# Patient Record
Sex: FEMALE | Race: WHITE | NOT HISPANIC OR LATINO | Employment: FULL TIME | URBAN - METROPOLITAN AREA
[De-identification: names, ages, dates, MRNs, and addresses within clinical notes are randomized per-mention and may not be internally consistent; named-entity substitution may affect disease eponyms.]

---

## 2023-09-28 NOTE — PROGRESS NOTES
"Subjective   Patient ID: Kassi Edmonds is a 33 y.o. female who presents for Follow-up (Follow up Covid 3 weeks ).    Presents for follow up from recent COVID infection  COVID positive a few weeks ago  Had taken time off over the duration of illness.   Covid symptoms completely resolved at this time.  Had a sore throat, sinus congestion and fatigue.   Had been exposed at work    Scheduled for tubal ligation in a few weeks.         Review of Systems    Objective   /76 (BP Location: Right arm, Patient Position: Sitting, BP Cuff Size: Adult)   Pulse 59   Temp 36.6 °C (97.8 °F) (Temporal)   Ht 1.6 m (5' 3\")   Wt 63.6 kg (140 lb 3.2 oz)   LMP 09/28/2023 (Exact Date)   SpO2 99%   BMI 24.84 kg/m²     Physical Exam  Vitals and nursing note reviewed.   Constitutional:       General: She is not in acute distress.     Appearance: Normal appearance. She is normal weight.   HENT:      Head: Normocephalic.      Right Ear: Tympanic membrane, ear canal and external ear normal.      Left Ear: Tympanic membrane, ear canal and external ear normal.      Nose: Nose normal.      Mouth/Throat:      Mouth: Mucous membranes are moist.      Pharynx: Posterior oropharyngeal erythema present.   Eyes:      Conjunctiva/sclera: Conjunctivae normal.   Cardiovascular:      Rate and Rhythm: Normal rate and regular rhythm.      Pulses: Normal pulses.      Heart sounds: Normal heart sounds. No murmur heard.  Pulmonary:      Effort: Pulmonary effort is normal. No respiratory distress.      Breath sounds: Normal breath sounds.   Lymphadenopathy:      Cervical: No cervical adenopathy.   Psychiatric:         Mood and Affect: Mood normal.         Assessment/Plan   Diagnoses and all orders for this visit:  Routine general medical examination at a health care facility  -     Lipid panel; Future  -     Basic metabolic panel; Future  BMI 24.0-24.9, adult  SARS-CoV-2 positive         "

## 2023-09-29 ENCOUNTER — OFFICE VISIT (OUTPATIENT)
Dept: PRIMARY CARE | Facility: CLINIC | Age: 33
End: 2023-09-29
Payer: COMMERCIAL

## 2023-09-29 VITALS
DIASTOLIC BLOOD PRESSURE: 76 MMHG | BODY MASS INDEX: 24.84 KG/M2 | SYSTOLIC BLOOD PRESSURE: 114 MMHG | HEART RATE: 59 BPM | HEIGHT: 63 IN | WEIGHT: 140.2 LBS | TEMPERATURE: 97.8 F | OXYGEN SATURATION: 99 %

## 2023-09-29 DIAGNOSIS — Z00.00 ROUTINE GENERAL MEDICAL EXAMINATION AT A HEALTH CARE FACILITY: Primary | ICD-10-CM

## 2023-09-29 DIAGNOSIS — U07.1 SARS-COV-2 POSITIVE: ICD-10-CM

## 2023-09-29 PROCEDURE — 3008F BODY MASS INDEX DOCD: CPT | Performed by: NURSE PRACTITIONER

## 2023-09-29 PROCEDURE — 1036F TOBACCO NON-USER: CPT | Performed by: NURSE PRACTITIONER

## 2023-09-29 PROCEDURE — 99395 PREV VISIT EST AGE 18-39: CPT | Performed by: NURSE PRACTITIONER

## 2023-09-29 RX ORDER — CETIRIZINE HYDROCHLORIDE 5 MG/1
5 TABLET ORAL DAILY
COMMUNITY

## 2023-09-29 ASSESSMENT — PATIENT HEALTH QUESTIONNAIRE - PHQ9
1. LITTLE INTEREST OR PLEASURE IN DOING THINGS: NOT AT ALL
2. FEELING DOWN, DEPRESSED OR HOPELESS: NOT AT ALL
SUM OF ALL RESPONSES TO PHQ9 QUESTIONS 1 AND 2: 0

## 2023-09-29 NOTE — PATIENT INSTRUCTIONS
Covid symptoms resolved at this time.    Order given for fasting blood work to have done when you have preop testing.    Stay active with regular exercise at least 30 minutes a day at least 5 days a week.  Follow a healthy low fat, low cholesterol diet.    Follow up in one year for annual well check

## 2023-12-13 ENCOUNTER — OFFICE VISIT (OUTPATIENT)
Dept: PRIMARY CARE | Facility: CLINIC | Age: 33
End: 2023-12-13
Payer: COMMERCIAL

## 2023-12-13 VITALS
DIASTOLIC BLOOD PRESSURE: 78 MMHG | HEART RATE: 68 BPM | WEIGHT: 139 LBS | HEIGHT: 63 IN | SYSTOLIC BLOOD PRESSURE: 118 MMHG | TEMPERATURE: 97.8 F | OXYGEN SATURATION: 99 % | BODY MASS INDEX: 24.63 KG/M2

## 2023-12-13 DIAGNOSIS — F98.8 ATTENTION DEFICIT DISORDER, UNSPECIFIED HYPERACTIVITY PRESENCE: Primary | ICD-10-CM

## 2023-12-13 PROCEDURE — 3008F BODY MASS INDEX DOCD: CPT | Performed by: NURSE PRACTITIONER

## 2023-12-13 PROCEDURE — 1036F TOBACCO NON-USER: CPT | Performed by: NURSE PRACTITIONER

## 2023-12-13 PROCEDURE — 99213 OFFICE O/P EST LOW 20 MIN: CPT | Performed by: NURSE PRACTITIONER

## 2023-12-13 PROCEDURE — 80307 DRUG TEST PRSMV CHEM ANLYZR: CPT

## 2023-12-13 RX ORDER — DEXTROAMPHETAMINE SACCHARATE, AMPHETAMINE ASPARTATE MONOHYDRATE, DEXTROAMPHETAMINE SULFATE AND AMPHETAMINE SULFATE 1.25; 1.25; 1.25; 1.25 MG/1; MG/1; MG/1; MG/1
5 CAPSULE, EXTENDED RELEASE ORAL EVERY MORNING
Qty: 30 CAPSULE | Refills: 0 | Status: SHIPPED | OUTPATIENT
Start: 2023-12-13 | End: 2024-01-18 | Stop reason: SDUPTHER

## 2023-12-13 ASSESSMENT — ENCOUNTER SYMPTOMS
DEPRESSION: 0
LOSS OF SENSATION IN FEET: 0
OCCASIONAL FEELINGS OF UNSTEADINESS: 0

## 2023-12-13 ASSESSMENT — PAIN SCALES - GENERAL: PAINLEVEL: 0-NO PAIN

## 2023-12-13 ASSESSMENT — PATIENT HEALTH QUESTIONNAIRE - PHQ9
2. FEELING DOWN, DEPRESSED OR HOPELESS: NOT AT ALL
1. LITTLE INTEREST OR PLEASURE IN DOING THINGS: NOT AT ALL
SUM OF ALL RESPONSES TO PHQ9 QUESTIONS 1 AND 2: 0

## 2023-12-13 NOTE — PROGRESS NOTES
"Subjective   Patient ID: Kassi Edmonds is a 33 y.o. female who presents for ADD and ADHD.    Hx of ADD.  Adderall 5mg last taken when in grad school.  After surgery has been having some trouble with focus and concentration, decreased motivation.  Had been on light duty, now able to return to normal work.  Mood is good. Sleep good.      ADD    ADHD         OARRS:  Ryanne Jordan, APRN-CNP on 12/13/2023  9:13 AM  I have personally reviewed the OARRS report for Kassi Edmonds. I have considered the risks of abuse, dependence, addiction and diversion and I believe that it is clinically appropriate for Kassi Edmonds to be prescribed this medication    Is the patient prescribed a combination of a benzodiazepine and opioid?  No    Last Urine Drug Screen / ordered today: Yes  No results found for this or any previous visit (from the past 8760 hour(s)).  Awaiting results    Clinical rationale for not completing a Urine Drug Screen: NA      Controlled Substance Agreement:  Date of the Last Agreement: 12/13/2023  Reviewed Controlled Substance Agreement including but not limited to the benefits, risks, and alternatives to treatment with a Controlled Substance medication(s).    Stimulants:   What is the patient's goal of therapy? ADD  Is this being achieved with current treatment? TBD    Activities of Daily Living:   Is your overall impression that this patient is benefiting (symptom reduction outweighs side effects) from stimulant therapy? TBD    1. Physical Functioning: Same  2. Family Relationship: Same  3. Social Relationship: Same  4. Mood: Same  5. Sleep Patterns: Same  6. Overall Function: Same      Review of Systems    Objective   /78 (BP Location: Right arm, Patient Position: Sitting, BP Cuff Size: Small adult)   Pulse 68   Temp 36.6 °C (97.8 °F) (Oral)   Ht 1.6 m (5' 3\")   Wt 63 kg (139 lb)   LMP 12/11/2023   SpO2 99%   BMI 24.62 kg/m²     Physical Exam  Vitals and nursing note reviewed.   Constitutional:  "      General: She is not in acute distress.     Appearance: Normal appearance. She is normal weight.   Cardiovascular:      Rate and Rhythm: Normal rate and regular rhythm.      Heart sounds: Normal heart sounds. No murmur heard.  Pulmonary:      Effort: Pulmonary effort is normal. No respiratory distress.      Breath sounds: Normal breath sounds.   Psychiatric:         Mood and Affect: Mood normal.         Judgment: Judgment normal.         Assessment/Plan   Diagnoses and all orders for this visit:  Attention deficit disorder, unspecified hyperactivity presence  -     Drug Screen, Urine With Reflex to Confirmation  -     amphetamine-dextroamphetamine XR (Adderall XR) 5 mg 24 hr capsule; Take 1 capsule (5 mg) by mouth once daily in the morning. Do not crush or chew.

## 2023-12-13 NOTE — PATIENT INSTRUCTIONS
CSA and UDS in office today.  Start Adderall XR 5mg daily.  Follow up in one month for evaluation of therapy and BP check

## 2023-12-14 LAB
AMPHETAMINES UR QL SCN: NORMAL
BARBITURATES UR QL SCN: NORMAL
BENZODIAZ UR QL SCN: NORMAL
BZE UR QL SCN: NORMAL
CANNABINOIDS UR QL SCN: NORMAL
FENTANYL+NORFENTANYL UR QL SCN: NORMAL
OPIATES UR QL SCN: NORMAL
OXYCODONE+OXYMORPHONE UR QL SCN: NORMAL
PCP UR QL SCN: NORMAL

## 2024-01-18 ENCOUNTER — OFFICE VISIT (OUTPATIENT)
Dept: PRIMARY CARE | Facility: CLINIC | Age: 34
End: 2024-01-18
Payer: COMMERCIAL

## 2024-01-18 VITALS
HEART RATE: 68 BPM | BODY MASS INDEX: 25.16 KG/M2 | WEIGHT: 142 LBS | OXYGEN SATURATION: 99 % | HEIGHT: 63 IN | SYSTOLIC BLOOD PRESSURE: 116 MMHG | DIASTOLIC BLOOD PRESSURE: 70 MMHG

## 2024-01-18 DIAGNOSIS — F98.8 ATTENTION DEFICIT DISORDER, UNSPECIFIED HYPERACTIVITY PRESENCE: Primary | ICD-10-CM

## 2024-01-18 DIAGNOSIS — Z00.00 ROUTINE GENERAL MEDICAL EXAMINATION AT A HEALTH CARE FACILITY: ICD-10-CM

## 2024-01-18 PROCEDURE — 1036F TOBACCO NON-USER: CPT | Performed by: NURSE PRACTITIONER

## 2024-01-18 PROCEDURE — 3008F BODY MASS INDEX DOCD: CPT | Performed by: NURSE PRACTITIONER

## 2024-01-18 PROCEDURE — 99213 OFFICE O/P EST LOW 20 MIN: CPT | Performed by: NURSE PRACTITIONER

## 2024-01-18 RX ORDER — DEXTROAMPHETAMINE SACCHARATE, AMPHETAMINE ASPARTATE MONOHYDRATE, DEXTROAMPHETAMINE SULFATE AND AMPHETAMINE SULFATE 1.25; 1.25; 1.25; 1.25 MG/1; MG/1; MG/1; MG/1
5 CAPSULE, EXTENDED RELEASE ORAL EVERY MORNING
Qty: 30 CAPSULE | Refills: 0 | Status: SHIPPED | OUTPATIENT
Start: 2024-01-18 | End: 2024-02-22 | Stop reason: SDUPTHER

## 2024-01-18 ASSESSMENT — PATIENT HEALTH QUESTIONNAIRE - PHQ9
6. FEELING BAD ABOUT YOURSELF - OR THAT YOU ARE A FAILURE OR HAVE LET YOURSELF OR YOUR FAMILY DOWN: NOT AT ALL
8. MOVING OR SPEAKING SO SLOWLY THAT OTHER PEOPLE COULD HAVE NOTICED. OR THE OPPOSITE, BEING SO FIGETY OR RESTLESS THAT YOU HAVE BEEN MOVING AROUND A LOT MORE THAN USUAL: NOT AT ALL
9. THOUGHTS THAT YOU WOULD BE BETTER OFF DEAD, OR OF HURTING YOURSELF: NOT AT ALL
2. FEELING DOWN, DEPRESSED OR HOPELESS: NOT AT ALL
SUM OF ALL RESPONSES TO PHQ9 QUESTIONS 1 AND 2: 3
5. POOR APPETITE OR OVEREATING: NOT AT ALL
SUM OF ALL RESPONSES TO PHQ QUESTIONS 1-9: 3
4. FEELING TIRED OR HAVING LITTLE ENERGY: NOT AT ALL
3. TROUBLE FALLING OR STAYING ASLEEP OR SLEEPING TOO MUCH: NOT AT ALL
1. LITTLE INTEREST OR PLEASURE IN DOING THINGS: NEARLY EVERY DAY
7. TROUBLE CONCENTRATING ON THINGS, SUCH AS READING THE NEWSPAPER OR WATCHING TELEVISION: NOT AT ALL

## 2024-01-18 ASSESSMENT — ENCOUNTER SYMPTOMS
OCCASIONAL FEELINGS OF UNSTEADINESS: 0
DEPRESSION: 0
LOSS OF SENSATION IN FEET: 0

## 2024-01-18 NOTE — PROGRESS NOTES
Subjective   Patient ID: Kassi Edmonds is a 34 y.o. female who presents for Follow-up (Patient is for Follow-up From a Recent medication That she Was Put on, She states The medication is working well.).    Presents for ADHD follow up.  Started on Adderall XR 5mg last visit.  Tolerating well. Denies any palpitations, insomnia.  Initial insomnia, now ok.  Has been taking most days. Does skip occasionally on off days.    Has been thinking about gender change surgery. Is interested in following up with [facility] - has been unable to get call back since September.          OARRS:  Ryanne Jordan, APRN-CNP on 1/18/2024  6:54 AM  I have personally reviewed the OARRS report for Kassi Edmonds. I have considered the risks of abuse, dependence, addiction and diversion and I believe that it is clinically appropriate for Kassi Edmonds to be prescribed this medication    Is the patient prescribed a combination of a benzodiazepine and opioid?  No    Last Urine Drug Screen / ordered today: Yes  Recent Results (from the past 8760 hour(s))   Drug Screen, Urine With Reflex to Confirmation    Collection Time: 12/13/23 12:17 PM   Result Value Ref Range    Amphetamine Screen, Urine Presumptive Negative Presumptive Negative    Barbiturate Screen, Urine Presumptive Negative Presumptive Negative    Benzodiazepines Screen, Urine Presumptive Negative Presumptive Negative    Cannabinoid Screen, Urine Presumptive Negative Presumptive Negative    Cocaine Metabolite Screen, Urine Presumptive Negative Presumptive Negative    Fentanyl Screen, Urine Presumptive Negative Presumptive Negative    Opiate Screen, Urine Presumptive Negative Presumptive Negative    Oxycodone Screen, Urine Presumptive Negative Presumptive Negative    PCP Screen, Urine Presumptive Negative Presumptive Negative     Results are as expected.         Controlled Substance Agreement:  Date of the Last Agreement: 12/13/2023  Reviewed Controlled Substance Agreement including but not  "limited to the benefits, risks, and alternatives to treatment with a Controlled Substance medication(s).    Stimulants:   What is the patient's goal of therapy? ADHD  Is this being achieved with current treatment? yes    Activities of Daily Living:   Is your overall impression that this patient is benefiting (symptom reduction outweighs side effects) from stimulant therapy? Yes     1. Physical Functioning: Better  2. Family Relationship: Better  3. Social Relationship: Better  4. Mood: Better  5. Sleep Patterns: Better  6. Overall Function: Better      Review of Systems    Objective   /70 (BP Location: Left arm, Patient Position: Sitting)   Pulse 68   Ht 1.6 m (5' 3\")   Wt 64.4 kg (142 lb)   SpO2 99%   BMI 25.15 kg/m²     Physical Exam  Vitals and nursing note reviewed.   Constitutional:       General: She is not in acute distress.     Appearance: Normal appearance. She is normal weight.   Cardiovascular:      Rate and Rhythm: Normal rate and regular rhythm.      Heart sounds: Normal heart sounds. No murmur heard.  Pulmonary:      Effort: Pulmonary effort is normal. No respiratory distress.      Breath sounds: Normal breath sounds.   Psychiatric:         Mood and Affect: Mood normal.         Behavior: Behavior normal.         Judgment: Judgment normal.         Assessment/Plan   Diagnoses and all orders for this visit:  Attention deficit disorder, unspecified hyperactivity presence  -     amphetamine-dextroamphetamine XR (Adderall XR) 5 mg 24 hr capsule; Take 1 capsule (5 mg) by mouth once daily in the morning. Do not crush or chew.  Routine general medical examination at a health care facility  -     Referral to [facility]; Future         "

## 2024-01-18 NOTE — PATIENT INSTRUCTIONS
ADHD well controlled on current medications.  Refill Adderall.  Follow up in 6 months for evaluation of therapy.    Referral placed, faxed for [facility] for [facility] program.  Please let me know if our office can further help with this process.

## 2024-02-22 ENCOUNTER — HOSPITAL ENCOUNTER (EMERGENCY)
Facility: HOSPITAL | Age: 34
Discharge: HOME | End: 2024-02-22
Payer: COMMERCIAL

## 2024-02-22 VITALS
OXYGEN SATURATION: 97 % | TEMPERATURE: 97.7 F | HEART RATE: 76 BPM | DIASTOLIC BLOOD PRESSURE: 74 MMHG | RESPIRATION RATE: 16 BRPM | SYSTOLIC BLOOD PRESSURE: 126 MMHG
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
## 2024-02-22 DIAGNOSIS — F98.8 ATTENTION DEFICIT DISORDER, UNSPECIFIED HYPERACTIVITY PRESENCE: ICD-10-CM

## 2024-02-22 LAB
ALBUMIN SERPL BCP-MCNC: 4.6 G/DL (ref 3.4–5)
ALP SERPL-CCNC: 34 U/L (ref 33–110)
ALT SERPL W P-5'-P-CCNC: 12 U/L (ref 7–45)
AST SERPL W P-5'-P-CCNC: 12 U/L (ref 9–39)
BILIRUB DIRECT SERPL-MCNC: 0.2 MG/DL (ref 0–0.3)
BILIRUB SERPL-MCNC: 0.6 MG/DL (ref 0–1.2)
HCV AB SER QL: NONREACTIVE
HIV 1+2 AB+HIV1 P24 AG SERPL QL IA: NONREACTIVE
PROT SERPL-MCNC: 7.7 G/DL (ref 6.4–8.2)

## 2024-02-22 PROCEDURE — 2500000004 HC RX 250 GENERAL PHARMACY W/ HCPCS (ALT 636 FOR OP/ED): Performed by: PHYSICIAN ASSISTANT

## 2024-02-22 PROCEDURE — 87389 HIV-1 AG W/HIV-1&-2 AB AG IA: CPT | Performed by: PHYSICIAN ASSISTANT

## 2024-02-22 PROCEDURE — 2500000005 HC RX 250 GENERAL PHARMACY W/O HCPCS: Performed by: PHYSICIAN ASSISTANT

## 2024-02-22 PROCEDURE — 86803 HEPATITIS C AB TEST: CPT | Performed by: PHYSICIAN ASSISTANT

## 2024-02-22 PROCEDURE — 80076 HEPATIC FUNCTION PANEL: CPT | Performed by: PHYSICIAN ASSISTANT

## 2024-02-22 PROCEDURE — 90715 TDAP VACCINE 7 YRS/> IM: CPT | Performed by: PHYSICIAN ASSISTANT

## 2024-02-22 PROCEDURE — 2500000001 HC RX 250 WO HCPCS SELF ADMINISTERED DRUGS (ALT 637 FOR MEDICARE OP): Performed by: PHYSICIAN ASSISTANT

## 2024-02-22 PROCEDURE — 90471 IMMUNIZATION ADMIN: CPT | Performed by: PHYSICIAN ASSISTANT

## 2024-02-22 PROCEDURE — 36415 COLL VENOUS BLD VENIPUNCTURE: CPT | Performed by: PHYSICIAN ASSISTANT

## 2024-02-22 PROCEDURE — 99284 EMERGENCY DEPT VISIT MOD MDM: CPT | Performed by: PHYSICIAN ASSISTANT

## 2024-02-22 PROCEDURE — 99283 EMERGENCY DEPT VISIT LOW MDM: CPT | Mod: 25 | Performed by: PHYSICIAN ASSISTANT

## 2024-02-22 RX ORDER — EMTRICITABINE AND TENOFOVIR DISOPROXIL FUMARATE 200; 300 MG/1; MG/1
1 TABLET, FILM COATED ORAL DAILY
Qty: 30 TABLET | Refills: 0 | Status: SHIPPED | OUTPATIENT
Start: 2024-02-22 | End: 2024-03-26 | Stop reason: ALTCHOICE

## 2024-02-22 RX ORDER — EMTRICITABINE AND TENOFOVIR DISOPROXIL FUMARATE 200; 300 MG/1; MG/1
1 TABLET, FILM COATED ORAL ONCE
Status: COMPLETED | OUTPATIENT
Start: 2024-02-22 | End: 2024-02-22

## 2024-02-22 RX ORDER — ONDANSETRON 4 MG/1
4 TABLET, ORALLY DISINTEGRATING ORAL ONCE
Status: COMPLETED | OUTPATIENT
Start: 2024-02-22 | End: 2024-02-22

## 2024-02-22 RX ORDER — ONDANSETRON 4 MG/1
4 TABLET, ORALLY DISINTEGRATING ORAL EVERY 8 HOURS PRN
Qty: 30 TABLET | Refills: 1 | Status: SHIPPED | OUTPATIENT
Start: 2024-02-22 | End: 2024-03-26 | Stop reason: ALTCHOICE

## 2024-02-22 RX ADMIN — TETANUS TOXOID, REDUCED DIPHTHERIA TOXOID AND ACELLULAR PERTUSSIS VACCINE, ADSORBED 0.5 ML: 5; 2.5; 8; 8; 2.5 SUSPENSION INTRAMUSCULAR at 14:00

## 2024-02-22 RX ADMIN — ONDANSETRON 4 MG: 4 TABLET, ORALLY DISINTEGRATING ORAL at 14:00

## 2024-02-22 RX ADMIN — DOLUTEGRAVIR SODIUM 50 MG: 50 TABLET, FILM COATED ORAL at 14:12

## 2024-02-22 RX ADMIN — EMTRICITABINE AND TENOFOVIR DISOPROXIL FUMARATE 1 TABLET: 200; 300 TABLET, FILM COATED ORAL at 14:12

## 2024-02-22 ASSESSMENT — COLUMBIA-SUICIDE SEVERITY RATING SCALE - C-SSRS
2. HAVE YOU ACTUALLY HAD ANY THOUGHTS OF KILLING YOURSELF?: NO
1. IN THE PAST MONTH, HAVE YOU WISHED YOU WERE DEAD OR WISHED YOU COULD GO TO SLEEP AND NOT WAKE UP?: NO
6. HAVE YOU EVER DONE ANYTHING, STARTED TO DO ANYTHING, OR PREPARED TO DO ANYTHING TO END YOUR LIFE?: NO

## 2024-02-22 NOTE — DISCHARGE INSTRUCTIONS
Follow-up with your primary care doctor.    According to CDC guidelines it is recommended to take postexposure prophylaxis for 30 days.  Take both the medications once daily.  Take the Zofran for any accompanying nausea or vomiting.  Keep the wound clean and dry.

## 2024-02-22 NOTE — Clinical Note
Kassi Edmonds was seen and treated in our emergency department on 2/22/2024.  She may return to work on 02/23/2024.       If you have any questions or concerns, please don't hesitate to call.      Wilman Flores PA-C

## 2024-02-22 NOTE — Clinical Note
Kassi Edmonds was seen and treated in our emergency department on 2/22/2024.  She may return to work on 02/22/2024.       If you have any questions or concerns, please don't hesitate to call.      Wilman Flores PA-C
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
## 2024-02-23 RX ORDER — DEXTROAMPHETAMINE SACCHARATE, AMPHETAMINE ASPARTATE MONOHYDRATE, DEXTROAMPHETAMINE SULFATE AND AMPHETAMINE SULFATE 1.25; 1.25; 1.25; 1.25 MG/1; MG/1; MG/1; MG/1
5 CAPSULE, EXTENDED RELEASE ORAL EVERY MORNING
Qty: 30 CAPSULE | Refills: 0 | Status: SHIPPED | OUTPATIENT
Start: 2024-02-23 | End: 2024-03-24

## 2024-03-26 ENCOUNTER — LAB (OUTPATIENT)
Dept: LAB | Facility: LAB | Age: 34
End: 2024-03-26
Payer: COMMERCIAL

## 2024-03-26 ENCOUNTER — OFFICE VISIT (OUTPATIENT)
Dept: ENDOCRINOLOGY | Facility: CLINIC | Age: 34
End: 2024-03-26
Payer: COMMERCIAL

## 2024-03-26 VITALS
BODY MASS INDEX: 23.92 KG/M2 | RESPIRATION RATE: 16 BRPM | HEIGHT: 63 IN | HEART RATE: 80 BPM | DIASTOLIC BLOOD PRESSURE: 76 MMHG | WEIGHT: 135 LBS | SYSTOLIC BLOOD PRESSURE: 132 MMHG

## 2024-03-26 DIAGNOSIS — R53.83 OTHER FATIGUE: ICD-10-CM

## 2024-03-26 DIAGNOSIS — R53.83 OTHER FATIGUE: Primary | ICD-10-CM

## 2024-03-26 LAB
ALBUMIN SERPL BCP-MCNC: 4.6 G/DL (ref 3.4–5)
ALP SERPL-CCNC: 37 U/L (ref 33–110)
ALT SERPL W P-5'-P-CCNC: 17 U/L (ref 7–45)
ANION GAP SERPL CALC-SCNC: 12 MMOL/L (ref 10–20)
AST SERPL W P-5'-P-CCNC: 13 U/L (ref 9–39)
BILIRUB SERPL-MCNC: 1 MG/DL (ref 0–1.2)
BUN SERPL-MCNC: 20 MG/DL (ref 6–23)
CALCIUM SERPL-MCNC: 10.1 MG/DL (ref 8.6–10.6)
CHLORIDE SERPL-SCNC: 105 MMOL/L (ref 98–107)
CO2 SERPL-SCNC: 28 MMOL/L (ref 21–32)
CREAT SERPL-MCNC: 0.93 MG/DL (ref 0.5–1.05)
EGFRCR SERPLBLD CKD-EPI 2021: 83 ML/MIN/1.73M*2
ERYTHROCYTE [DISTWIDTH] IN BLOOD BY AUTOMATED COUNT: 13.9 % (ref 11.5–14.5)
EST. AVERAGE GLUCOSE BLD GHB EST-MCNC: 108 MG/DL
GLUCOSE SERPL-MCNC: 64 MG/DL (ref 74–99)
HBA1C MFR BLD: 5.4 %
HCT VFR BLD AUTO: 43.1 % (ref 36–46)
HGB BLD-MCNC: 14.1 G/DL (ref 12–16)
MCH RBC QN AUTO: 30.3 PG (ref 26–34)
MCHC RBC AUTO-ENTMCNC: 32.7 G/DL (ref 32–36)
MCV RBC AUTO: 93 FL (ref 80–100)
NRBC BLD-RTO: 0 /100 WBCS (ref 0–0)
PLATELET # BLD AUTO: 302 X10*3/UL (ref 150–450)
POTASSIUM SERPL-SCNC: 3.8 MMOL/L (ref 3.5–5.3)
PROT SERPL-MCNC: 7.7 G/DL (ref 6.4–8.2)
RBC # BLD AUTO: 4.65 X10*6/UL (ref 4–5.2)
SODIUM SERPL-SCNC: 141 MMOL/L (ref 136–145)
TSH SERPL-ACNC: 2.41 MIU/L (ref 0.44–3.98)
WBC # BLD AUTO: 5 X10*3/UL (ref 4.4–11.3)

## 2024-03-26 PROCEDURE — 84443 ASSAY THYROID STIM HORMONE: CPT

## 2024-03-26 PROCEDURE — 83036 HEMOGLOBIN GLYCOSYLATED A1C: CPT

## 2024-03-26 PROCEDURE — 85027 COMPLETE CBC AUTOMATED: CPT

## 2024-03-26 PROCEDURE — 1036F TOBACCO NON-USER: CPT | Performed by: INTERNAL MEDICINE

## 2024-03-26 PROCEDURE — 99204 OFFICE O/P NEW MOD 45 MIN: CPT | Performed by: INTERNAL MEDICINE

## 2024-03-26 PROCEDURE — 80053 COMPREHEN METABOLIC PANEL: CPT

## 2024-03-26 PROCEDURE — 3008F BODY MASS INDEX DOCD: CPT | Performed by: INTERNAL MEDICINE

## 2024-03-26 PROCEDURE — 36415 COLL VENOUS BLD VENIPUNCTURE: CPT

## 2024-03-26 ASSESSMENT — ENCOUNTER SYMPTOMS
HEADACHES: 0
PALPITATIONS: 0
CHILLS: 0
DIARRHEA: 0
COUGH: 0
SHORTNESS OF BREATH: 0
VOMITING: 0
NAUSEA: 0
FATIGUE: 0
FEVER: 0

## 2024-03-26 NOTE — PROGRESS NOTES
Endocrinology New Patient Consult  Subjective   Patient ID: Kassi Edmonds is a 34 y.o. female who presents for Gender Dysphoria. Patient was referred by Ryanne Jordan CNP    PCP: TRISTAN Hannon    HPI  34-year-old referred by RADHA Jordan CNP for evaluation of gender dysphoria. They/them pronouns.   Prefers name of [place].  Currently staying therapist regarding gender issues.  Nonbinary.  Recently discussed top surgery with primary care provider and was referred for evaluation.  Does not desire hormonal treatment at this point.  They do note strong family history of diabetes so has been trying to be very careful about weight eating and exercise.  No history of recent blood work.  Very busy working as paramedic Regular menstrual periods despite the diagnosis of endometriosis.  No previous breast issues    Review of Systems   Constitutional:  Negative for chills, fatigue and fever.   Respiratory:  Negative for cough and shortness of breath.    Cardiovascular:  Negative for chest pain and palpitations.   Gastrointestinal:  Negative for diarrhea, nausea and vomiting.   Neurological:  Negative for headaches.       Patient Active Problem List   Diagnosis    Attention deficit disorder       Past surgical history  Salpingectomy lysis of adhesions    Social History     Tobacco Use   Smoking Status Never    Passive exposure: Never   Smokeless Tobacco Never      Social History     Substance and Sexual Activity   Alcohol Use Not Currently      Marital status:   Employment: Paramedic    Family history several grandparents aunts and uncles with type 2 diabetes    Home Meds:  Current Outpatient Medications   Medication Instructions    amphetamine-dextroamphetamine XR (Adderall XR) 5 mg 24 hr capsule 5 mg, oral, Every morning, Do not crush or chew.    cetirizine (ZYRTEC) 5 mg, oral, Daily        Allergies   Allergen Reactions    Cat Hair Standardized Allergenic Extract Other        Objective   Vitals:    03/26/24 0912   BP:  "132/76   Pulse: 80   Resp: 16      Vitals:    03/26/24 0912   Weight: 61.2 kg (135 lb)      Body mass index is 23.91 kg/m².   Physical Exam  Constitutional:       Appearance: Normal appearance. She is normal weight.   HENT:      Head: Normocephalic and atraumatic.   Neck:      Thyroid: No thyroid mass, thyromegaly or thyroid tenderness.   Cardiovascular:      Rate and Rhythm: Normal rate and regular rhythm.      Heart sounds: No murmur heard.     No gallop.   Pulmonary:      Effort: Pulmonary effort is normal.      Breath sounds: Normal breath sounds.   Abdominal:      Palpations: Abdomen is soft.      Comments: benign   Neurological:      General: No focal deficit present.      Mental Status: She is alert and oriented to person, place, and time.      Deep Tendon Reflexes: Reflexes are normal and symmetric.   Psychiatric:         Behavior: Behavior is cooperative.         Labs:  Lab Results   Component Value Date    TSH 2.41 03/26/2024      No results found for: \"PR1\", \"THYROIDPAB\", \"TSI\"     Assessment/Plan   Problem List Items Addressed This Visit    None  Visit Diagnoses       Other fatigue    -  Primary    Relevant Orders    Comprehensive Metabolic Panel    Hemoglobin A1C    TSH with reflex to Free T4 if abnormal (Completed)    CBC (Completed)          Reviewed course.  I will contact breast surgery to see who would best be able to address top surgery with them.  Given strong family history of diabetes we did do comprehensive blood work today including a screening A1c.  I encouraged them to keep up efforts with diet and exercise.  We did review hormone options and results.  They will follow-up as needed  Electronically signed by:  Simona Carpenter MD 03/26/24  5:18 PM    "

## 2024-04-17 DIAGNOSIS — F64.9 GENDER DYSPHORIA: Primary | ICD-10-CM

## 2024-06-21 NOTE — PROGRESS NOTES
"Plastic Surgery Clinic Visit    Patient Name: Kassi Edmonds  MRN: [id number]  Date: 6/25/24    Virtual or Telephone Consent    An interactive audio and video telecommunication system which permits real time communications between the patient (at the originating site) and provider (at the distant site) was utilized to provide this telehealth service.   Verbal consent was requested and obtained from Kassi Edmonds on this date, 06/25/24 for a telehealth visit.       History of Present Illness  Kassi Edmonds is a 34 y.o. female with a past medical history of gender dysphoria. Preferred pronouns are she/they/them. Patient prefers to go by the name \"[place].\" They are not interested in hormonal therapy at this point.     Subjective      No past medical history on file.  No past surgical history on file.  Allergies   Allergen Reactions    Cat Hair Standardized Allergenic Extract Other       Current Outpatient Medications:     amphetamine-dextroamphetamine XR (Adderall XR) 5 mg 24 hr capsule, Take 1 capsule (5 mg) by mouth once daily in the morning. Do not crush or chew., Disp: 30 capsule, Rfl: 0    cetirizine (ZyrTEC) 5 mg tablet, Take 1 tablet (5 mg) by mouth once daily., Disp: , Rfl:      No family history on file.    Social History     Tobacco Use    Smoking status: Never     Passive exposure: Never    Smokeless tobacco: Never   Substance Use Topics    Alcohol use: Not Currently    Drug use: Yes     Types: Marijuana     Review of Systems   ROS negative except as noted above in HPI.     Objective  There were no vitals taken for this visit. This is a virtual visit.    Physical Exam     Diagnostics   No results found.    Assessment/Plan  Kassi Edmonds is a 34 y.o. female with a past medical history of gender dysphoria. Patient prefers to go by the name \"[place].\"     We discussed chest masculinization surgery.   Risks/benefits and complications including but not limited to: pain, bleeding, infection, scarring, wound breakdown, loss " of nipple sensation, loss NAC, asymmetry, poor cosmesis, need for repeat/additional procedures, damage to adjacent/surrounding structures, as well as alternatives to procedure were discussed with the patient who verbalized understanding.   Educated on procedure in detail including location of scars and anticipated postoperative recovery timeframe  All questions were answered to patient satisfaction      RTC for in person preoperative visit.     Scribe Attestation  By signing my name below, Sydni SANCHEZ Scribe   attest that this documentation has been prepared under the direction and in the presence of Antoinette Dudley MD. Obtained verbal consent for scribe from patient.     Attending Attestation:  Antoinette SANCHEZ MD, personal performed the history, exam, and decision making on this patient.

## 2024-06-25 ENCOUNTER — TELEMEDICINE (OUTPATIENT)
Dept: PLASTIC SURGERY | Facility: CLINIC | Age: 34
End: 2024-06-25
Payer: COMMERCIAL

## 2024-06-25 ENCOUNTER — APPOINTMENT (OUTPATIENT)
Dept: PLASTIC SURGERY | Facility: CLINIC | Age: 34
End: 2024-06-25
Payer: COMMERCIAL

## 2024-06-25 DIAGNOSIS — F64.0 GENDER DYSPHORIA IN ADULT: Primary | ICD-10-CM

## 2024-06-25 PROCEDURE — 3008F BODY MASS INDEX DOCD: CPT | Performed by: SURGERY

## 2024-06-25 PROCEDURE — 99202 OFFICE O/P NEW SF 15 MIN: CPT | Performed by: SURGERY

## 2024-06-27 DIAGNOSIS — F98.8 ATTENTION DEFICIT DISORDER, UNSPECIFIED HYPERACTIVITY PRESENCE: ICD-10-CM

## 2024-06-27 RX ORDER — DEXTROAMPHETAMINE SACCHARATE, AMPHETAMINE ASPARTATE MONOHYDRATE, DEXTROAMPHETAMINE SULFATE AND AMPHETAMINE SULFATE 1.25; 1.25; 1.25; 1.25 MG/1; MG/1; MG/1; MG/1
5 CAPSULE, EXTENDED RELEASE ORAL EVERY MORNING
Qty: 30 CAPSULE | Refills: 0 | Status: SHIPPED | OUTPATIENT
Start: 2024-06-27 | End: 2024-07-27

## 2024-07-17 NOTE — PROGRESS NOTES
"PLASTIC SURGERY PRE-OP CLINIC NOTE  DATE: 7/23/24  Subjective :  Patient ID: Kassi Edmonds  is a 34 y.o. female is here for pre-op appointment     HPI:   Kassi Edmonds is a 34 y.o. female with a past medical history of gender dysphoria. Preferred pronouns are she/they/them. Patient prefers to go by the name \"[place].\" They are not interested in hormonal therapy at this point. She is planning on a chest masculinization surgery.     She is of Ashkenazi Gnosticist decent. She reports extensive family hx of cancer.       Patient's vitals are Blood pressure 111/67, pulse 65, height 1.6 m (5' 3\"), weight 60.7 kg (133 lb 12.8 oz). today.     MEDICATIONS    Current Outpatient Medications:     amphetamine-dextroamphetamine XR (Adderall XR) 5 mg 24 hr capsule, Take 1 capsule (5 mg) by mouth once daily in the morning. Do not crush or chew., Disp: 30 capsule, Rfl: 0    cetirizine (ZyrTEC) 5 mg tablet, Take 1 tablet (5 mg) by mouth once daily., Disp: , Rfl:      REVIEW OF SYSTEMS:    Constitutional: negative for fevers, chills, unintentional weight loss  HEENT: negative for changes in vision, headaches, changes in hearing, congestion, sore throat  Cardiovascular: negative for chest pain, palpitations  Respiratory: negative for cough, shortness of breath  Gastrointestinal: negative for nausea, vomiting, diarrhea  Genitourinary: negative for dysuria, hematuria  Musculoskeletal: negative for joint swelling or pain  Skin: negative for rashes or lesions  Psych: negative for depression, anxiety  Endocrine: negative for polyuria, polydipsia, cold/heat intolerance  Hem/Lymph: negative for bleeding disorder    PHYSICAL EXAM  General: alert and oriented, no apparent distress  Psych: normal mood and affect, judgement intact.   Eyes: No conjunctival erythema, extraocular movements intact, no scleral icterus, pupils equal and reactive to light  HENT: normocephalic, atraumatic, no rhinorrhea, no trauma to external meatus, no prior surgical scars  CV: " "hemodynamically stable  Resp: unlabored, no increased work of breathing, equal bilateral chest rise, no cough or stridor  Abdomen: soft, non-tender, non-distended. No surgical scars present. No rashes noted. No rectus diastasis present   Neuro: Unremarkable without focal findings, AAOx3, CN 2-12 grossly intact  Extremities/Musculoskeletal: Upper and lower extremities are atraumatic in appearance without tenderness or deformity. No swelling or erythema. Full range of motion is noted to all joints. Steady gait noted.    Skin: Intact, soft and warm; no rashes, lesions, or bruising. No large masses or keloids noted on exam.     Focused exam of the breasts:  Left Breast:  NTN: 20  BW: 13  NTF: 5  Comments: inframammary fold 5 mm lower on left than right. Prominent nipple, nipple height 1 cm. No ptosis. Electra under nipple beneath nipple areolar complex.     Right Breast:  NTN: 20  BW: 13  NTF: 5.5  Comments: Prominent nipple, nipple height 1 cm. No ptosis. Electra under nipple beneath nipple areolar complex.     Photos completed at this visit.       LABORATORY  Nutrition  Lab Results   Component Value Date    ALBUMIN 4.6 03/26/2024          ASSESSMENT/PLAN  Kassi Edmonds is a 34 y.o. female with a past medical history of gender dysphoria. Preferred pronouns are she/they/them. Patient prefers to go by the name \"[place].\" They are not interested in hormonal therapy at this point. She is planning on a chest masculinization surgery.     The patient will be undergoing bilateral reduction of nipple height and liposuction on 10/10/2024 tentatively.     We discussed obtaining genetic testing prior to surgery since she has family history of cancer at young age to rule out BRCA  mutation. If this is the case, she could potentially consider bilateral mastectomy.      Photos completed at this visit.     RTC early September 2024 to touch base.     Scribe Attestation  By signing my name below, ISydni Scribe   attest that this " documentation has been prepared under the direction and in the presence of Antoinette Dudley MD. Obtained verbal consent for scribe from patient.     Attending Attestation:  I, Antoinette Dudley MD, personal performed the history, exam, and decision making on this patient.  A total of 30 mins were spent in patient counseling, review of medical records, and coordination of care.

## 2024-07-23 ENCOUNTER — APPOINTMENT (OUTPATIENT)
Dept: PRIMARY CARE | Facility: CLINIC | Age: 34
End: 2024-07-23
Payer: COMMERCIAL

## 2024-07-23 ENCOUNTER — OFFICE VISIT (OUTPATIENT)
Dept: PLASTIC SURGERY | Facility: CLINIC | Age: 34
End: 2024-07-23
Payer: COMMERCIAL

## 2024-07-23 VITALS
HEIGHT: 63 IN | BODY MASS INDEX: 23.71 KG/M2 | DIASTOLIC BLOOD PRESSURE: 67 MMHG | HEART RATE: 65 BPM | WEIGHT: 133.8 LBS | SYSTOLIC BLOOD PRESSURE: 111 MMHG

## 2024-07-23 DIAGNOSIS — F64.0 GENDER DYSPHORIA IN ADULT: ICD-10-CM

## 2024-07-23 PROCEDURE — 3008F BODY MASS INDEX DOCD: CPT | Performed by: SURGERY

## 2024-07-23 PROCEDURE — 99214 OFFICE O/P EST MOD 30 MIN: CPT | Performed by: SURGERY

## 2024-07-23 RX ORDER — IMIQUIMOD 12.5 MG/.25G
CREAM TOPICAL
COMMUNITY

## 2024-07-24 ENCOUNTER — APPOINTMENT (OUTPATIENT)
Dept: PRIMARY CARE | Facility: CLINIC | Age: 34
End: 2024-07-24
Payer: COMMERCIAL

## 2024-07-24 VITALS
TEMPERATURE: 97.6 F | OXYGEN SATURATION: 97 % | SYSTOLIC BLOOD PRESSURE: 120 MMHG | HEIGHT: 63 IN | DIASTOLIC BLOOD PRESSURE: 70 MMHG | HEART RATE: 84 BPM | BODY MASS INDEX: 23.74 KG/M2 | WEIGHT: 134 LBS

## 2024-07-24 DIAGNOSIS — F98.8 ATTENTION DEFICIT DISORDER, UNSPECIFIED HYPERACTIVITY PRESENCE: Primary | ICD-10-CM

## 2024-07-24 DIAGNOSIS — R00.2 PALPITATIONS: ICD-10-CM

## 2024-07-24 PROBLEM — F64.0 GENDER DYSPHORIA IN ADULT: Status: ACTIVE | Noted: 2024-07-23

## 2024-07-24 PROCEDURE — 99214 OFFICE O/P EST MOD 30 MIN: CPT | Performed by: NURSE PRACTITIONER

## 2024-07-24 ASSESSMENT — ENCOUNTER SYMPTOMS
DEPRESSION: 0
OCCASIONAL FEELINGS OF UNSTEADINESS: 0
LOSS OF SENSATION IN FEET: 0

## 2024-07-24 ASSESSMENT — PAIN SCALES - GENERAL: PAINLEVEL: 0-NO PAIN

## 2024-07-24 NOTE — PATIENT INSTRUCTIONS
Reassured most recent EKG from June normal  Appears to have been related to medication interactions  Since asymptomatic, will watch symptoms for now  Follow up for further evaluation with any recurrence of symptoms    ADHD: Continue current dose of Adderall   Schedule 6 month follow up

## 2024-07-24 NOTE — PROGRESS NOTES
Subjective   Patient ID: Kassi Edmonds is a 34 y.o. female who presents for Med Management    Presents for follow up for ADHD    Recently scratched by combative client.  Was scratched by older patient, HIV positive.  Had been given PREP prophylaxis  Had elevated HR spikes, up to 130-150  Stopped taking for 2 months  Has had EKG at work (in March and May), concerns for Long covid damage to heart        OARRS:  Ryanne Jordan, MENDY-MONTSERRAT on 7/24/2024 11:36 AM  I have personally reviewed the OARRS report for Kassi Edmonds. I have considered the risks of abuse, dependence, addiction and diversion and I believe that it is clinically appropriate for Kassi Edmonds to be prescribed this medication    Is the patient prescribed a combination of a benzodiazepine and opioid?  No    Last Urine Drug Screen / ordered today: Yes  Recent Results (from the past 8760 hour(s))   Drug Screen, Urine With Reflex to Confirmation    Collection Time: 12/13/23 12:17 PM   Result Value Ref Range    Amphetamine Screen, Urine Presumptive Negative Presumptive Negative    Barbiturate Screen, Urine Presumptive Negative Presumptive Negative    Benzodiazepines Screen, Urine Presumptive Negative Presumptive Negative    Cannabinoid Screen, Urine Presumptive Negative Presumptive Negative    Cocaine Metabolite Screen, Urine Presumptive Negative Presumptive Negative    Fentanyl Screen, Urine Presumptive Negative Presumptive Negative    Opiate Screen, Urine Presumptive Negative Presumptive Negative    Oxycodone Screen, Urine Presumptive Negative Presumptive Negative    PCP Screen, Urine Presumptive Negative Presumptive Negative     Results are as expected.     Controlled Substance Agreement:  Date of the Last Agreement: 12/13/2023  Reviewed Controlled Substance Agreement including but not limited to the benefits, risks, and alternatives to treatment with a Controlled Substance medication(s).    Stimulants:   What is the patient's goal of therapy? ADHD  Is this  "being achieved with current treatment? yes    Activities of Daily Living:   Is your overall impression that this patient is benefiting (symptom reduction outweighs side effects) from stimulant therapy? Yes     1. Physical Functioning: Better  2. Family Relationship: Better  3. Social Relationship: Better  4. Mood: Better  5. Sleep Patterns: Better  6. Overall Function: Better      Review of Systems    Objective     /70 (BP Location: Right arm, Patient Position: Sitting, BP Cuff Size: Small adult)   Pulse 84   Temp 36.4 °C (97.6 °F) (Temporal)   Ht 1.6 m (5' 3\")   Wt 60.8 kg (134 lb)   LMP 07/23/2024   SpO2 97%   BMI 23.74 kg/m²      Physical Exam  Vitals and nursing note reviewed.   Constitutional:       General: She is not in acute distress.     Appearance: Normal appearance.   Cardiovascular:      Rate and Rhythm: Normal rate and regular rhythm.      Pulses: Normal pulses.      Heart sounds: Normal heart sounds. No murmur heard.  Pulmonary:      Effort: Pulmonary effort is normal. No respiratory distress.      Breath sounds: Normal breath sounds.   Psychiatric:         Mood and Affect: Mood normal.         Judgment: Judgment normal.          Assessment/Plan   Diagnoses and all orders for this visit:  Attention deficit disorder, unspecified hyperactivity presence  Palpitations      Patient Instructions   Reassured most recent EKG from June normal  Appears to have been related to medication interactions  Since asymptomatic, will watch symptoms for now  Follow up for further evaluation with any recurrence of symptoms    ADHD: Continue current dose of Adderall   Schedule 6 month follow up   "

## 2024-07-30 ENCOUNTER — TELEPHONE (OUTPATIENT)
Dept: PLASTIC SURGERY | Facility: CLINIC | Age: 34
End: 2024-07-30
Payer: COMMERCIAL

## 2024-07-30 NOTE — TELEPHONE ENCOUNTER
Returned call to patient re: genetics appointment scheduled for December. I will call over to see if she could be scheduled any sooner.

## 2024-08-01 ENCOUNTER — CLINICAL SUPPORT (OUTPATIENT)
Dept: GENETICS | Facility: CLINIC | Age: 34
End: 2024-08-01
Payer: COMMERCIAL

## 2024-08-01 ENCOUNTER — LAB (OUTPATIENT)
Dept: LAB | Facility: LAB | Age: 34
End: 2024-08-01
Payer: COMMERCIAL

## 2024-08-01 VITALS
WEIGHT: 139.2 LBS | HEIGHT: 63 IN | BODY MASS INDEX: 24.66 KG/M2 | DIASTOLIC BLOOD PRESSURE: 81 MMHG | SYSTOLIC BLOOD PRESSURE: 119 MMHG | TEMPERATURE: 97.7 F | HEART RATE: 83 BPM

## 2024-08-01 DIAGNOSIS — Z80.3 FAMILY HISTORY OF BREAST CANCER: ICD-10-CM

## 2024-08-01 DIAGNOSIS — Z80.9 FAMILY HX-MALIGNANCY: ICD-10-CM

## 2024-08-01 DIAGNOSIS — Z13.71 ENCOUNTER FOR NONPROCREATIVE GENETIC COUNSELING AND TESTING: Primary | ICD-10-CM

## 2024-08-01 DIAGNOSIS — Z80.0 FAMILY HISTORY OF PANCREATIC CANCER: ICD-10-CM

## 2024-08-01 DIAGNOSIS — Z71.83 ENCOUNTER FOR NONPROCREATIVE GENETIC COUNSELING AND TESTING: Primary | ICD-10-CM

## 2024-08-01 PROCEDURE — 96040 HC GENETIC COUNSELING, EACH 30 MIN: CPT | Performed by: GENETIC COUNSELOR, MS

## 2024-08-01 PROCEDURE — 96040 PR MEDICAL GENETICS COUNSELING EACH 30 MINUTES: CPT | Performed by: GENETIC COUNSELOR, MS

## 2024-08-01 NOTE — PROGRESS NOTES
"History of Present Illness:  Kassi Edmonds \"Joshua\" is a 34 y.o. individual who is assigned-female-at-birth (AFAB) with a family history of cancer.  Their preferred pronouns are she/they. She was referred to the Cancer Genetics Clinic at [facility] by Dr. Antoinette Dudley. Joshua is interested in genetic testing to clarify her personal risk for cancer, as well as the risks to her family members. Joshua is planning on undergoing chest masculinization surgery in the near future, and hereditary cancer testing may be helpful in determining what procedure they ultimately choose. Of note, they are tentatively scheduled for bilateral reduction of nipple height and liposuction on 10/10/2024.    Cancer Medical History:  Personal history of cancer? No.    Prior hereditary cancer (germline) genetic testing? No.    Cancer screening history:  Mammograms? No.  Breast MRI? No.  Breast biopsy? No.  PAP smear? Yes. No concerns per patient.  Colonoscopy? Yes, x1 in 2019 due to GI issues. Does not think they had any polyps.  Upper endoscopy? No.  Dermatology? Yes x1, as needed.  Other cancer screening? No.    Reproductive History:  Menarche (age): 10 or 11.  OCP: No.    Hysterectomy? No.  Oophorectomy? No. Ovaries in situ. S/p bilateral salpingectomies 10/2023.    Family history:  A 4-generation pedigree was obtained and was significant for the following:    -Patient, no history of cancer;  -Mother, bladder cancer? At 64, non-smoker, alive at 65;  -Maternal grandfather, bladder cancer, smoker,  in his early 50s;  -Maternal grandmother, colon cancer in her 60s, still living;  -Maternal great grandmother (MGM's mother), breast cancer unknown age,  in her late 80s;  -Maternal great aunt, sister to MGM, cancer NOS, ;  -Father, no history of cancer, alive at 64;  -Paternal uncle, colon cancer in his early 50s/late 50s,  shortly after diagnosis;  -Paternal grandfather, pancreatic cancer,  in his mid 70s;  -Paternal " grandmother, cancer NOS at younger age,  at 70s+.    Maternal ancestry is Persian/Polish (Ashkenazi Hindu).  Paternal ancestry is Yi Hindu/Albanian/Persian (Ashkenazi Hindu).  Consanguinity is endorsed; maternal grandparents are first or second cousins.    Genetic counseling:  We discussed that, overall, Joshua's family history is most suggestive of sporadic cancers, however given their Hindu ancestry as well as a family history of pancreatic cancer in her paternal grandfather and breast cancer in her maternal great-grandmother, they are at increased risk to be carrying a gene mutation/pathogenic variant associated with hereditary cancer predisposition.    Individuals of Ashkenazi Hindu ancestry have a 2-3% chance to be carrying one of the three Ashkenazi Hindu  mutations in BRCA1 and BRCA2, independent of the family history of cancer.  In addition, they have up to a 10% chance to be carrying a risk factor for colon cancer (APC [id number]).  Other hereditary cancer predispositions are sometimes seen more commonly people of Ashkenazi Hindu ancestry, including Martinez syndrome, which is an inherited predisposition to colon, uterine, stomach, ovarian, and other cancers.      Based on Joshua's family history, they do NOT meet the National Comprehensive Cancer Network (NCCN) criteria for genetic testing of any common hereditary cancer predispositions, meaning it is not clinically indicated. NCCN do note that testing may be considered in the following scenario (with appropriate pre-test education and access to post-test management): an individual of Ashkenazi Hindu ancestry without additional risk factors  This means it is still appropriate for [place] to consider testing given their ancestry and the need for information to assist in upcoming surgical planning,    We reviewed genes and chromosomes, using inherited forms of breast and ovarian cancer, and the BRCA1 and BRCA2 genes causing HBOC as a framework for  discussion.  We discussed that most cancers are not due to an inherited genetic susceptibility.  However, in about 5-10% of families, there is an inherited genetic mutation that can make a person more susceptible to developing certain forms of cancer.  Within these families, we often see multiple family members with cancer, occurring in multiple generations.  In addition, earlier onset and bilateral cancers are suggestive of an inherited form of cancer.  Finally, there is a clustering of certain types of cancer in these families, such as breast and ovarian cancer.    We discussed that there are high-risk cancer genes, including the BRCA1 & BRCA2 genes linked to early-onset breast and/or ovarian cancer and other cancers, as well as some of the Martinez syndrome genes linked to colorectal and uterine cancers, as well as other cancers.  Mutations in these genes are inherited in a dominant pattern, meaning there is a 50% chance that they are passed from parent to child with each generation.    We discussed that there are multiple genes associated with increased cancer risk. Some genes, like the BRCA genes are considered highly penetrant cancer genes, meaning a mutation in the gene confers a high risk of cancer. On the other hand, there are other intermediate (moderate risk) cancer genes. For many of the moderate risk genes, there is sometimes limited information regarding the degree to which a mutation in the gene affects risk of different types of cancers. Additionally, for some of these moderate risk genes, the appropriate management for individuals who have a mutation in one of these genes is not always clear. In many cases, even if an individual tests positive for a mutation in a moderate risk gene, recommendations are still based on the family history, not the positive test result.    Omega was counseled about hereditary cancer susceptibility including cancer risks, options for increased screening and/or risk reduction,  genetic testing (including positive, negative, and uncertain results), and the implications for other family members.  We discussed performing testing for hereditary cancer susceptibility genes as part of a multi-gene panel.  We specifically discussed the both the 34-gene Ambry CancerNext panel and the 71-gene Ambry CancerNext-Expanded panel. Joshua was most interested in the Ambry CancerNext-Expanded panel, which examines the following 71 genes:  AIP, ALK, APC, MARIA ANTONIA, AXIN2, BAP1, BARD1, BMPR1A, BRCA1, BRCA2, BRIP1, CDC73, CDH1, CDK4, CDKN1B, CDKN2A, CHEK2, CTNNA1, DICER1, EGFR, EGLN1, EPCAM, FH, FLCN, GREM1, HOXB13, KIF1B, KIT, LZTR1, MAX, MEN1, MET, MITF, MLH1, MSH2, MSH3, MSH6, MUTYH, NF1, NF2, NTHL1, PALB2, PDGFRA, PHOX2B, PMS2, POLD1, POLE, POT1, BUUCF8F, PTCH1, PTEN, RAD51C, RAD51D, RB1, RET, SDHA, SDHAF2, SDHB, SDHC, SDHD, SMAD4, SMARCA4, SMARCB1, SMARCE1, STK11, SUFU, LJVU624, TP53, TSC1, TSC2, VHL.    We discussed the Genetic Information Nondiscrimination Act (DESIRE). We discussed the protections and limitations of DESIRE. DESIRE generally makes in illegal for health insurance companies, group health plans, and most employers (with >15 employees) to discriminate based on genetic information. It does not protect against genetic discrimination for life insurance, disability insurance, long-term care, or other insurances.    After a discussion about the risks, benefits, and limitations of genetic testing, Joshua elected to undergo genetic testing for hereditary cancer using the [facility] panel described above. She chose the self-pay ($249) option. She gave her oral and written consent to proceed with testing, and had her blood drawn today.    Results are typically available within 3 weeks from the time of blood draw.  We agreed to call the results out to Joshua when they are available, but they should also be visible to her in her [facility].  We will ask her to return for a follow-up visit for further discussion should she be  found to have a known pathogenic or likely pathogenic variant or variant of uncertain significance (VUS). At that time, we would make recommendations for both Joshua and their family members in terms of cancer screening and/or cancer risk reduction options.    PLAN:  1. Joshua elected to undergo genetic testing for hereditary cancer using the Ambry panel described above. She chose the self-pay ($249) option. She gave her oral and written consent to proceed with testing, and had her blood drawn today.    2. Results are typically available within 3 weeks from the time of blood draw.  We agreed to call the results out to Joshua when they are available, but they should also be visible to her in her  MyChart.    3. We will ask Joshua to return for a follow-up visit for further discussion should she be found to have a known pathogenic or likely pathogenic variant or variant of uncertain significance (VUS). At that time, we would make recommendations for both Joshua and their family members in terms of cancer screening and/or cancer risk reduction options.    4. We remain available to [place] [phone] if any questions arise regarding information discussed at today's visit. Ruth can be reached directly at [phone].    Ruth Grace MS, [facility] [place]  Genetic Counselor  [facility]  [phone]    Reviewed by:  Meghana Myers MD  Clinical   [facility]  [phone]    Time spent with patient:  51 minutes (9:05-9:56 am), in person.

## 2024-08-27 LAB
COMMENTS - MP RESULT TYPE: NORMAL
SCAN RESULT: NORMAL

## 2024-08-30 NOTE — PROGRESS NOTES
PLASTIC SURGERY PRE-OP CLINIC NOTE    Subjective :  Patient ID: Kassi Edmonds  is a 34 y.o. female is here for pre-op appointment     Virtual or Telephone Consent    An interactive audio and video telecommunication system which permits real time communications between the patient (at the originating site) and provider (at the distant site) was utilized to provide this telehealth service.   Verbal consent was requested and obtained from Kassi Edmonds on this date, 09/03/24 for a telehealth visit.       Planned Date of Procedure: 10/10/24  Planned Surgical Procedure: Bilateral breast gynecomastia reduction    HPI:   Kassi Edmonds is a 34 y.o. female is here to discuss gynecomastia treatment.  Joshua has gender dysphoria and identifies as nonbinary.  Joshua reports that genetic testing was negative.  I explained that we had a peer to peer with her insurance company which had denied her treatment using a suction assisted lipectomy.  They will not cover the liposuction code however they would cover a surgical plan of therapy.  We discussed doing excision of the inferior lateral pole leaving a L shape incision in the inframammary fold that travels laterally.  I do not think we need to do a free nipple graft for the nipple areolar complexes because they are ready in good position.  I explained that if after excision there appears to be adequate perfusion I could still do the nipple tip reduction however if there is any concern for perfusion I would hold off on this portion of the surgery.    Currently, the patient states there were no changes in their medications or medical history.     Patient's vitals are There were no vitals taken for this visit.      Patient is not currently on an ACE, ARB, or Diuretic.     Patient has Good Rx Card.     Patient is not on chronic NSAIDs.     MEDICATIONS  Current Outpatient Medications:     amphetamine-dextroamphetamine XR (Adderall XR) 5 mg 24 hr capsule, Take 1 capsule (5 mg) by mouth once  daily in the morning. Do not crush or chew., Disp: 30 capsule, Rfl: 0    cetirizine (ZyrTEC) 5 mg tablet, Take 1 tablet (5 mg) by mouth once daily., Disp: , Rfl:     imiquimod (Aldara) 5 % cream, APPLY SMALL AMOUNT OVER WARTS AT NIGHT LEAVE ON OVERNIGHT WASH OFF IN THE MORNING ., Disp: , Rfl:      REVIEW OF SYSTEMS:    Constitutional: negative for fevers, chills, unintentional weight loss  HEENT: negative for changes in vision, headaches, changes in hearing, congestion, sore throat  Cardiovascular: negative for chest pain, palpitations  Respiratory: negative for cough, shortness of breath  Gastrointestinal: negative for nausea, vomiting, diarrhea  Genitourinary: negative for dysuria, hematuria  Musculoskeletal: negative for joint swelling or pain  Skin: negative for rashes or lesions  Psych: negative for depression, anxiety  Endocrine: negative for polyuria, polydipsia, cold/heat intolerance  Hem/Lymph: negative for bleeding disorder    PHYSICAL EXAM  General: alert and oriented, no apparent distress  Psych: normal mood and affect, judgement intact.   Eyes: No conjunctival erythema    Focused exam of the breasts:  Deferred due to virtual visit.       LABORATORY  Nutrition  Lab Results   Component Value Date    ALBUMIN 4.6 03/26/2024          ASSESSMENT/PLAN  Kassi Edmonds is a 34 y.o. female with hx of gender dysphoria    The patient will be undergoing a bilateral gynecomastia surgical excision on 9/23/24 at [facility] [place].    Informed consent discussed with the patient, including: condition, proposed care, treatments and services, alternative forms of treatment, and risks of no treatment.  Details discussed around the procedures to be used, and the risks and hazards involved, potential benefits, and side effects of the patient's proposed care, treatment, and services; the likelihood of the patient achieving his or her goals; and any potential problems that might occur during recuperation. Reasonable alternative also discussed  with the patient's proposed care, treatment, and services. The discussion encompasses risks, benefits, and side effects related to the alternative and risks related to not receiving the proposed care, treatment, and services. Written informed consent was obtained.    The patient was counseled to avoid anticoagulation medications and herbals including - but not limited to - ASA, NSAIDs, Plavix, fish oils, and green tea    Patient was counseled to avoid nicotine and areas with high amounts of secondhand smoke.     Patient was counseled to increase protein intake after surgery to aid with wound healing with goal of 120g/day.     Patient was counseled on need for compression garments and/or support bras, given information about where to acquire these garments, and instructions to bring with on the day of surgery.     We discussed postoperative follow-up visits, recuperation and return to work.    Advised patient to contact office with any questions or concerns    All findings and diagnosis was discussed with patient at the time of this visit. Patient states they understand and are in agreement with the treatment plan at the time of this visit.     Preop to be scheduled with Nursing.  Consent done on day of surgery.  So could you add packs to your list    RTC 10/22/24 after surgery     Attending Attestation:  Antoinette SANCHEZ MD, personal performed the history, counseling, and decision making on this patient.  A total of 15 mins were spent in patient counseling, review of medical records, and coordination of care.

## 2024-09-03 ENCOUNTER — APPOINTMENT (OUTPATIENT)
Dept: PLASTIC SURGERY | Facility: CLINIC | Age: 34
End: 2024-09-03
Payer: COMMERCIAL

## 2024-09-03 DIAGNOSIS — Z01.818 PREOP TESTING: ICD-10-CM

## 2024-09-03 DIAGNOSIS — Z13.89 PROTEIN SCREENING: ICD-10-CM

## 2024-09-03 DIAGNOSIS — G89.18 ACUTE POSTOPERATIVE PAIN: ICD-10-CM

## 2024-09-03 DIAGNOSIS — F64.0 GENDER DYSPHORIA IN ADULT: Primary | ICD-10-CM

## 2024-09-03 PROCEDURE — 99212 OFFICE O/P EST SF 10 MIN: CPT | Performed by: SURGERY

## 2024-09-03 RX ORDER — ACETAMINOPHEN 500 MG
1000 TABLET ORAL EVERY 8 HOURS
Qty: 84 TABLET | Refills: 0 | Status: SHIPPED | OUTPATIENT
Start: 2024-09-03 | End: 2024-09-17

## 2024-09-03 RX ORDER — CELECOXIB 200 MG/1
200 CAPSULE ORAL 2 TIMES DAILY
Qty: 28 CAPSULE | Refills: 0 | Status: SHIPPED | OUTPATIENT
Start: 2024-09-03 | End: 2024-09-17

## 2024-09-03 RX ORDER — CHLORHEXIDINE GLUCONATE 40 MG/ML
SOLUTION TOPICAL ONCE
Qty: 30 ML | Refills: 0 | Status: SHIPPED | OUTPATIENT
Start: 2024-09-03 | End: 2024-09-03

## 2024-09-03 RX ORDER — GABAPENTIN 300 MG/1
300 CAPSULE ORAL EVERY 8 HOURS
Qty: 42 CAPSULE | Refills: 0 | Status: SHIPPED | OUTPATIENT
Start: 2024-09-03 | End: 2024-09-17

## 2024-09-06 ENCOUNTER — TELEPHONE (OUTPATIENT)
Dept: GENETICS | Facility: CLINIC | Age: 34
End: 2024-09-06
Payer: COMMERCIAL

## 2024-09-06 NOTE — TELEPHONE ENCOUNTER
Left message for patient. Want to review recent hereditary cancer genetic test results with them. No pathogenic variants found. 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 Will also send MyChart message.    Ruth Grace MS, [facility] [place]  Genetic Counselor  [facility]  [phone]

## 2024-10-02 ENCOUNTER — APPOINTMENT (OUTPATIENT)
Dept: PLASTIC SURGERY | Facility: CLINIC | Age: 34
End: 2024-10-02
Payer: COMMERCIAL

## 2024-10-07 ENCOUNTER — APPOINTMENT (OUTPATIENT)
Dept: PLASTIC SURGERY | Facility: CLINIC | Age: 34
End: 2024-10-07
Payer: COMMERCIAL

## 2024-10-07 DIAGNOSIS — F64.0 GENDER DYSPHORIA IN ADULT: Primary | ICD-10-CM

## 2024-10-07 PROCEDURE — 1036F TOBACCO NON-USER: CPT | Performed by: SURGERY

## 2024-10-07 PROCEDURE — 99024 POSTOP FOLLOW-UP VISIT: CPT | Performed by: SURGERY

## 2024-10-08 NOTE — PROGRESS NOTES
Met with patient virtually to review post operative instructions including activity restrictions, medications and possible GIOVANNA drain care. We will see her in clinic on 10/22/24. All questions were answered to patient satisfaction.

## 2024-10-09 ENCOUNTER — ANESTHESIA EVENT (OUTPATIENT)
Dept: OPERATING ROOM | Facility: HOSPITAL | Age: 34
End: 2024-10-09
Payer: COMMERCIAL

## 2024-10-09 NOTE — ANESTHESIA PREPROCEDURE EVALUATION
"Patient: Kassi Edmonds \"[place]\"    Procedure Information       Date/Time: 10/10/24 0730    Procedure: Bilateral Breast Mammaplasty (Bilateral: Breast)    Location: [facility] OR  / [facility] OR    Surgeons: Antoinette Dudley MD            Relevant Problems   Social   (+) Gender dysphoria in adult      Mental Health   (+) Attention deficit disorder       Clinical information reviewed:                   NPO Detail:  No data recorded     Physical Exam    Airway  Mallampati: II     Cardiovascular   Rhythm: regular  Rate: normal     Dental    Pulmonary    Abdominal            Anesthesia Plan    History of general anesthesia?: yes  History of complications of general anesthesia?: no    ASA 2     general     intravenous induction   Anesthetic plan and risks discussed with patient.    Plan discussed with CRNA and CAA.      "

## 2024-10-10 ENCOUNTER — ANESTHESIA (OUTPATIENT)
Dept: OPERATING ROOM | Facility: HOSPITAL | Age: 34
End: 2024-10-10
Payer: COMMERCIAL

## 2024-10-10 ENCOUNTER — HOSPITAL ENCOUNTER (OUTPATIENT)
Facility: HOSPITAL | Age: 34
Setting detail: OUTPATIENT SURGERY
Discharge: HOME | End: 2024-10-10
Attending: SURGERY | Admitting: SURGERY
Payer: COMMERCIAL

## 2024-10-10 VITALS
HEART RATE: 61 BPM | SYSTOLIC BLOOD PRESSURE: 111 MMHG | OXYGEN SATURATION: 97 % | HEIGHT: 63 IN | BODY MASS INDEX: 24.3 KG/M2 | RESPIRATION RATE: 18 BRPM | DIASTOLIC BLOOD PRESSURE: 62 MMHG | TEMPERATURE: 98.1 F | WEIGHT: 137.13 LBS

## 2024-10-10 DIAGNOSIS — F64.0 GENDER DYSPHORIA IN ADULT: ICD-10-CM

## 2024-10-10 PROCEDURE — 2500000004 HC RX 250 GENERAL PHARMACY W/ HCPCS (ALT 636 FOR OP/ED): Performed by: NURSE ANESTHETIST, CERTIFIED REGISTERED

## 2024-10-10 PROCEDURE — 2500000001 HC RX 250 WO HCPCS SELF ADMINISTERED DRUGS (ALT 637 FOR MEDICARE OP): Performed by: ANESTHESIOLOGY

## 2024-10-10 PROCEDURE — 2500000005 HC RX 250 GENERAL PHARMACY W/O HCPCS: Performed by: SURGERY

## 2024-10-10 PROCEDURE — 2500000004 HC RX 250 GENERAL PHARMACY W/ HCPCS (ALT 636 FOR OP/ED): Performed by: SURGERY

## 2024-10-10 PROCEDURE — 7100000009 HC PHASE TWO TIME - INITIAL BASE CHARGE: Performed by: SURGERY

## 2024-10-10 PROCEDURE — 3600000003 HC OR TIME - INITIAL BASE CHARGE - PROCEDURE LEVEL THREE: Performed by: SURGERY

## 2024-10-10 PROCEDURE — 2720000007 HC OR 272 NO HCPCS: Performed by: SURGERY

## 2024-10-10 PROCEDURE — 7100000002 HC RECOVERY ROOM TIME - EACH INCREMENTAL 1 MINUTE: Performed by: SURGERY

## 2024-10-10 PROCEDURE — 2500000001 HC RX 250 WO HCPCS SELF ADMINISTERED DRUGS (ALT 637 FOR MEDICARE OP): Performed by: SURGERY

## 2024-10-10 PROCEDURE — 3700000001 HC GENERAL ANESTHESIA TIME - INITIAL BASE CHARGE: Performed by: SURGERY

## 2024-10-10 PROCEDURE — 88305 TISSUE EXAM BY PATHOLOGIST: CPT | Performed by: PATHOLOGY

## 2024-10-10 PROCEDURE — 19318 BREAST REDUCTION: CPT | Performed by: SURGERY

## 2024-10-10 PROCEDURE — 19318 BREAST REDUCTION: CPT

## 2024-10-10 PROCEDURE — 2500000005 HC RX 250 GENERAL PHARMACY W/O HCPCS: Performed by: ANESTHESIOLOGY

## 2024-10-10 PROCEDURE — 3600000008 HC OR TIME - EACH INCREMENTAL 1 MINUTE - PROCEDURE LEVEL THREE: Performed by: SURGERY

## 2024-10-10 PROCEDURE — 0753T DGTZ GLS MCRSCP SLD LEVEL IV: CPT | Mod: TC,AHULAB | Performed by: SURGERY

## 2024-10-10 PROCEDURE — 3700000002 HC GENERAL ANESTHESIA TIME - EACH INCREMENTAL 1 MINUTE: Performed by: SURGERY

## 2024-10-10 PROCEDURE — 2500000005 HC RX 250 GENERAL PHARMACY W/O HCPCS: Performed by: NURSE ANESTHETIST, CERTIFIED REGISTERED

## 2024-10-10 PROCEDURE — 7100000010 HC PHASE TWO TIME - EACH INCREMENTAL 1 MINUTE: Performed by: SURGERY

## 2024-10-10 PROCEDURE — 7100000001 HC RECOVERY ROOM TIME - INITIAL BASE CHARGE: Performed by: SURGERY

## 2024-10-10 RX ORDER — HYDROMORPHONE HYDROCHLORIDE 1 MG/ML
INJECTION, SOLUTION INTRAMUSCULAR; INTRAVENOUS; SUBCUTANEOUS AS NEEDED
Status: DISCONTINUED | OUTPATIENT
Start: 2024-10-10 | End: 2024-10-10

## 2024-10-10 RX ORDER — SCOLOPAMINE TRANSDERMAL SYSTEM 1 MG/1
1 PATCH, EXTENDED RELEASE TRANSDERMAL ONCE
Status: DISCONTINUED | OUTPATIENT
Start: 2024-10-10 | End: 2024-10-10 | Stop reason: HOSPADM

## 2024-10-10 RX ORDER — KETOROLAC TROMETHAMINE 30 MG/ML
INJECTION, SOLUTION INTRAMUSCULAR; INTRAVENOUS AS NEEDED
Status: DISCONTINUED | OUTPATIENT
Start: 2024-10-10 | End: 2024-10-10

## 2024-10-10 RX ORDER — ONDANSETRON HYDROCHLORIDE 2 MG/ML
INJECTION, SOLUTION INTRAVENOUS AS NEEDED
Status: DISCONTINUED | OUTPATIENT
Start: 2024-10-10 | End: 2024-10-10

## 2024-10-10 RX ORDER — ROCURONIUM BROMIDE 10 MG/ML
INJECTION, SOLUTION INTRAVENOUS AS NEEDED
Status: DISCONTINUED | OUTPATIENT
Start: 2024-10-10 | End: 2024-10-10

## 2024-10-10 RX ORDER — PROPOFOL 10 MG/ML
INJECTION, EMULSION INTRAVENOUS AS NEEDED
Status: DISCONTINUED | OUTPATIENT
Start: 2024-10-10 | End: 2024-10-10

## 2024-10-10 RX ORDER — CEFAZOLIN 1 G/1
INJECTION, POWDER, FOR SOLUTION INTRAVENOUS AS NEEDED
Status: DISCONTINUED | OUTPATIENT
Start: 2024-10-10 | End: 2024-10-10

## 2024-10-10 RX ORDER — ALBUTEROL SULFATE 0.83 MG/ML
2.5 SOLUTION RESPIRATORY (INHALATION) ONCE AS NEEDED
Status: DISCONTINUED | OUTPATIENT
Start: 2024-10-10 | End: 2024-10-10 | Stop reason: HOSPADM

## 2024-10-10 RX ORDER — SODIUM CHLORIDE 9 MG/ML
INJECTION, SOLUTION INTRAVENOUS CONTINUOUS PRN
Status: DISCONTINUED | OUTPATIENT
Start: 2024-10-10 | End: 2024-10-10

## 2024-10-10 RX ORDER — GABAPENTIN 300 MG/1
600 CAPSULE ORAL ONCE
Status: COMPLETED | OUTPATIENT
Start: 2024-10-10 | End: 2024-10-10

## 2024-10-10 RX ORDER — FENTANYL CITRATE 50 UG/ML
INJECTION, SOLUTION INTRAMUSCULAR; INTRAVENOUS AS NEEDED
Status: DISCONTINUED | OUTPATIENT
Start: 2024-10-10 | End: 2024-10-10

## 2024-10-10 RX ORDER — SODIUM CHLORIDE 0.9 G/100ML
IRRIGANT IRRIGATION AS NEEDED
Status: DISCONTINUED | OUTPATIENT
Start: 2024-10-10 | End: 2024-10-10 | Stop reason: HOSPADM

## 2024-10-10 RX ORDER — LIDOCAINE HYDROCHLORIDE 10 MG/ML
0.1 INJECTION, SOLUTION EPIDURAL; INFILTRATION; INTRACAUDAL; PERINEURAL ONCE
Status: DISCONTINUED | OUTPATIENT
Start: 2024-10-10 | End: 2024-10-10 | Stop reason: HOSPADM

## 2024-10-10 RX ORDER — SODIUM CHLORIDE, SODIUM LACTATE, POTASSIUM CHLORIDE, CALCIUM CHLORIDE 600; 310; 30; 20 MG/100ML; MG/100ML; MG/100ML; MG/100ML
100 INJECTION, SOLUTION INTRAVENOUS CONTINUOUS
Status: DISCONTINUED | OUTPATIENT
Start: 2024-10-10 | End: 2024-10-10 | Stop reason: HOSPADM

## 2024-10-10 RX ORDER — OXYCODONE HYDROCHLORIDE 5 MG/1
5 TABLET ORAL EVERY 4 HOURS PRN
Status: DISCONTINUED | OUTPATIENT
Start: 2024-10-10 | End: 2024-10-10 | Stop reason: HOSPADM

## 2024-10-10 RX ORDER — ONDANSETRON HYDROCHLORIDE 2 MG/ML
4 INJECTION, SOLUTION INTRAVENOUS ONCE AS NEEDED
Status: DISCONTINUED | OUTPATIENT
Start: 2024-10-10 | End: 2024-10-10 | Stop reason: HOSPADM

## 2024-10-10 RX ORDER — IPRATROPIUM BROMIDE 0.5 MG/2.5ML
500 SOLUTION RESPIRATORY (INHALATION) ONCE
Status: DISCONTINUED | OUTPATIENT
Start: 2024-10-10 | End: 2024-10-10 | Stop reason: HOSPADM

## 2024-10-10 RX ORDER — DROPERIDOL 2.5 MG/ML
0.62 INJECTION, SOLUTION INTRAMUSCULAR; INTRAVENOUS ONCE AS NEEDED
Status: DISCONTINUED | OUTPATIENT
Start: 2024-10-10 | End: 2024-10-10 | Stop reason: HOSPADM

## 2024-10-10 RX ORDER — ACETAMINOPHEN 325 MG/1
975 TABLET ORAL ONCE
Status: COMPLETED | OUTPATIENT
Start: 2024-10-10 | End: 2024-10-10

## 2024-10-10 RX ORDER — LIDOCAINE HYDROCHLORIDE 20 MG/ML
INJECTION, SOLUTION INFILTRATION; PERINEURAL AS NEEDED
Status: DISCONTINUED | OUTPATIENT
Start: 2024-10-10 | End: 2024-10-10

## 2024-10-10 RX ORDER — ACETAMINOPHEN 325 MG/1
650 TABLET ORAL EVERY 4 HOURS PRN
Status: DISCONTINUED | OUTPATIENT
Start: 2024-10-10 | End: 2024-10-10 | Stop reason: HOSPADM

## 2024-10-10 RX ORDER — MIDAZOLAM HYDROCHLORIDE 1 MG/ML
INJECTION INTRAMUSCULAR; INTRAVENOUS AS NEEDED
Status: DISCONTINUED | OUTPATIENT
Start: 2024-10-10 | End: 2024-10-10

## 2024-10-10 ASSESSMENT — PAIN - FUNCTIONAL ASSESSMENT
PAIN_FUNCTIONAL_ASSESSMENT: 0-10
PAIN_FUNCTIONAL_ASSESSMENT: UNABLE TO SELF-REPORT
PAIN_FUNCTIONAL_ASSESSMENT: 0-10
PAIN_FUNCTIONAL_ASSESSMENT: UNABLE TO SELF-REPORT

## 2024-10-10 ASSESSMENT — PAIN DESCRIPTION - ORIENTATION: ORIENTATION: LEFT

## 2024-10-10 ASSESSMENT — PAIN SCALES - GENERAL
PAINLEVEL_OUTOF10: 4
PAINLEVEL_OUTOF10: 0 - NO PAIN
PAINLEVEL_OUTOF10: 4
PAINLEVEL_OUTOF10: 0 - NO PAIN
PAINLEVEL_OUTOF10: 3
PAINLEVEL_OUTOF10: 1

## 2024-10-10 ASSESSMENT — COLUMBIA-SUICIDE SEVERITY RATING SCALE - C-SSRS
6. HAVE YOU EVER DONE ANYTHING, STARTED TO DO ANYTHING, OR PREPARED TO DO ANYTHING TO END YOUR LIFE?: NO
2. HAVE YOU ACTUALLY HAD ANY THOUGHTS OF KILLING YOURSELF?: NO
1. IN THE PAST MONTH, HAVE YOU WISHED YOU WERE DEAD OR WISHED YOU COULD GO TO SLEEP AND NOT WAKE UP?: NO

## 2024-10-10 ASSESSMENT — PAIN DESCRIPTION - LOCATION: LOCATION: BREAST

## 2024-10-10 NOTE — ANESTHESIA PROCEDURE NOTES
Airway  Date/Time: 10/10/2024 8:09 AM  Urgency: elective    Airway not difficult    Staffing  Performed: CRNA   Authorized by: José Miguel Kenny MD    Performed by: MENDY Dia-MARLENE  Patient location during procedure: OR    Indications and Patient Condition  Indications for airway management: anesthesia  Spontaneous Ventilation: absent  Sedation level: deep  Preoxygenated: yes  Patient position: sniffing  MILS maintained throughout  Mask difficulty assessment: 1 - vent by mask  No planned trial extubation    Final Airway Details  Final airway type: endotracheal airway      Cuffed: yes   Successful intubation technique: direct laryngoscopy  Facilitating devices/methods: intubating stylet  Endotracheal tube insertion site: oral  Blade: Ricardo  Blade size: #3  Cormack-Lehane Classification: grade I - full view of glottis  Placement verified by: capnometry   Measured from: teeth  ETT to teeth (cm): 20  Number of attempts at approach: 1

## 2024-10-10 NOTE — ANESTHESIA POSTPROCEDURE EVALUATION
"Patient: Kassi Edmonds \"[place]\"    Procedure Summary       Date: 10/10/24 Room / Location: [facility] OR 05 / Virtual [facility] OR    Anesthesia Start: 0800 Anesthesia Stop: 1144    Procedure: Bilateral Breast Mammaplasty (Bilateral: Breast) Diagnosis:       Gender dysphoria in adult      (Gender dysphoria in adult [F64.0])    Surgeons: Antoinette Dudley MD Responsible Provider: José Miguel Kenny MD    Anesthesia Type: general ASA Status: 2            Anesthesia Type: general    Vitals Value Taken Time   /74 10/10/24 1231   Temp 36.7 °C (98.1 °F) 10/10/24 1140   Pulse 56 10/10/24 1243   Resp 16 10/10/24 1230   SpO2 97 % 10/10/24 1243   Vitals shown include unfiled device data.    Anesthesia Post Evaluation    Patient location during evaluation: bedside  Patient participation: complete - patient participated  Level of consciousness: awake  Pain management: adequate  Airway patency: patent  Cardiovascular status: acceptable  Respiratory status: acceptable  Hydration status: acceptable  Postoperative Nausea and Vomiting: none        No notable events documented.    "

## 2024-10-10 NOTE — DISCHARGE INSTRUCTIONS
Plastic and Reconstructive Surgery Post Operative Instructions  You had surgery today at [facility] with Dr. Dudley of plastic and reconstructive surgery. Included below are post-operative instructions and details regarding follow-up.     Thank you for allowing us to participate in your care and we wish you the best!    Best Regards,  [facility]  Department of Plastic and Reconstructive Surgery    Activity:  Please avoid strenuous activity. Start walking short distances as soon as possible, as this helps to reduce swelling and lowers the chance of blood clots . No pushing, pulling or lifting objects greater than 5 pounds.      You may not  shower until drains are removed. Avoid soaking or submerging surgical incisions/sites or wetting wound dressing.      Surgical Site/Wound Care:  wound care recs    Prevena/wound vac Chest  You are being discharge with a Prevena incisional CHest wound vac in place over a closed surgical incision to your Chest. Please allow Prevena wound vac dressing to remain in place until follow-up with plastic surgery. Do not allow the wound vac dressing or device to become wet. When resting, please make sure to plug in the device with the supplied power cord to maintain the battery. The device has a power button at the center which is encircled by green lights. There will be one less light illuminated each day (every 24 hours) which is to be expected, and signifies the count down of the duration of the vac device. If you experience any issues with your wound vac including alarms, malfunction or dressing issues please refer to the provided instruction manual or contact the plastic surgery office at [phone].        Avoid application of creams, lotions or ointments to surgical site, no soaking or scrubbing of surgical sites.     Please do not apply ice or heat directly to the skin as feeling to the area may be diminished.    Please notify our  office immediately if developing signs of infection which include increased redness, swelling, fever/chills, green/yellow drainage, or foul odor from wound. Plastic Surgery office line: [phone].      3 weeks  after surgery you may begin to massage your incisions with body lotion, BioOil, or scar cream. Do not use 100% vitamin E as it can cause skin irritation.    Avoid exposing scars to sun for at least 12 months. Always use a strong sunblock if sun exposure is unavoidable (SPF 30 or greater).    Drain care:  You are being discharged with GIOVANNA drains. Please empty and record the output every 8 hours or as needed. To empty the drain, open the cap, tip into cup and squeeze to empty. Squeeze drain flat then replace the cap.  Please empty the drain and record its output 3 times a day and bring these numbers to your follow up appointment.  The drain output should decrease and the color of the drainage should become lighter (red to pink to yellow).  This drain is sutured into place.  Keep the area around the drain clean and dry.  You may use mild soap and water to cleanse around the drain.  do not soak in a tub. Call the office if you notice drastic changes in drain output, bloody drain output, or redness/drainage around insertion site.    Nutrition:  You may resume a regular diet following surgery with increased protein intake. Ensure that you are drinking an adequate amount of fluids to maintain hydration as well as consuming a diet high in protein and low in sugar. You may consider increasing your fiber intake to avoid constipation.    Do not smoke, as smoking delays healing and increases the risk of complications. Also be sure you are not around people that smoke for at least 6 weeks after surgery.  Second hand smoke is just as harmful as if you were to smoke.    Medication Instructions:  You may resume use of your home prescribed mediations as previously directed following discharge from the hospital. If you were  taking medications prior to your surgery and they are not listed on your discharge homegoing instructions medications list, consult your MD before you resume these medications.    Some postoperative pain is not unusual. This is usually relieved by taking prescribed or over the counter Acetaminophen/Tylenol, Motrin/ibuprofen and Gabapentin Severe pain despite administration of pain medication must be reported to your physician.    Remember when taking Acetaminophen, do NOT exceed more than 1000 milligrams (mg) per dose or more than 4000 mg total per day. Taking too much Acetaminophen at one time can damage your liver. The maximum amount of ibuprofen in adults is 800 mg per dose or 3200 mg per day. Call your MD if you have any questions about your medications. To prevent constipation while taking narcotic pain medications, please utilize your prescribed bowel regimen, ensure that you drink plenty of water, eat fiber rich foods (a good source is fruit) and increase activity progressively.    DO NOT drive a car while utilizing narcotic pain medications and until cleared by MD at follow-up appointment. Driving or operating heavy machinery, lawnmowers or power tools while taking opiod/narcotic pain medications may impair your judgement.    Call Physician If:  Call your MD or seek immediate medical attention if you experience any of the following symptoms:  1. Fever of 101.5 (38.5 C) or greater  2. Pain not controlled with prescribed pain medications  3. Uncontrolled nausea and/or vomiting  4. Drainage or swelling around your incisions and/or surgical sites   5. Separation of incisions, or tearing of the incision line  6. Large fluid collection under or around the incision or flap sites   7. Flap discoloration (including darkened appearance)  8. Difficulty breathing  9. Swelling, pain, heat and/or redness in your legs and/or calves  10. Inability to tolerate diet/fluid intake    Contact the plastic surgery office for any  questions and/or concerns regarding the surgical incision/site.  1. [phone] if Monday-Friday (8 a.m. - 4:30 p.m.)  2. [phone] and ask for the Plastic Surgeon lenny if after hours or on weekends  3. Please send a picture with any wound concerns to our plastic surgery email at [email or web address]    Follow-up/Post Discharge Appointments:  Follow-up care is a key part of your treatment and safety. It is very important that you maintain follow-up care as directed so that your surgical site heals properly and does not lead to problems. Always carry a current medication list with you and bring it to ALL healthcare Provider visits. Be sure to maintain follow up with plastic surgery at your scheduled appointment. If you are unable to keep your appointment, or need to reschedule please contact our office at [phone].

## 2024-10-10 NOTE — H&P
"History Of Present Illness  Kassi Edmonds \"[place]\" is a 34 y.o. female presenting with Gender dysphoria.     Past Medical History  Past Medical History:   Diagnosis Date    Delayed emergence from general anesthesia        Surgical History  Past Surgical History:   Procedure Laterality Date    TUBAL LIGATION          Social History  She reports that she has never smoked. She has never been exposed to tobacco smoke. She has never used smokeless tobacco. She reports current alcohol use. She reports that she does not currently use drugs after having used the following drugs: Marijuana.    Family History  No family history on file.     Allergies  Cat hair standardized allergenic extract    Review of Systems     Physical Exam     Last Recorded Vitals  Blood pressure 126/55, pulse 85, temperature 36.6 °C (97.9 °F), temperature source Temporal, resp. rate 15, height 1.6 m (5' 3\"), weight 62.2 kg (137 lb 2 oz), SpO2 100%.    Relevant Results        Scheduled medications  acetaminophen, 975 mg, oral, Once  gabapentin, 600 mg, oral, Once  scopolamine, 1 patch, transdermal, Once      Continuous medications     PRN medications    No results found for this or any previous visit (from the past 24 hour(s)).      No results found.      Assessment/Plan   Assessment & Plan  Gender dysphoria in adult      Bilateral Breast Reductions       No changes to medications or history and physical since last seen on 7/23/2024 and telehealths on 9/3/2024      Antoinette Dudley MD    "

## 2024-10-14 ASSESSMENT — PAIN SCALES - GENERAL: PAINLEVEL_OUTOF10: 0 - NO PAIN

## 2024-10-16 ENCOUNTER — TELEPHONE (OUTPATIENT)
Dept: PLASTIC SURGERY | Facility: CLINIC | Age: 34
End: 2024-10-16
Payer: COMMERCIAL

## 2024-10-17 LAB
LABORATORY COMMENT REPORT: NORMAL
PATH REPORT.FINAL DX SPEC: NORMAL
PATH REPORT.GROSS SPEC: NORMAL
PATH REPORT.RELEVANT HX SPEC: NORMAL
PATH REPORT.TOTAL CANCER: NORMAL

## 2024-10-22 ENCOUNTER — APPOINTMENT (OUTPATIENT)
Dept: PLASTIC SURGERY | Facility: CLINIC | Age: 34
End: 2024-10-22
Payer: COMMERCIAL

## 2024-10-22 VITALS
SYSTOLIC BLOOD PRESSURE: 122 MMHG | HEART RATE: 69 BPM | WEIGHT: 137 LBS | DIASTOLIC BLOOD PRESSURE: 75 MMHG | HEIGHT: 63 IN | BODY MASS INDEX: 24.27 KG/M2

## 2024-10-22 DIAGNOSIS — F64.0 GENDER DYSPHORIA IN ADULT: Primary | ICD-10-CM

## 2024-10-22 PROCEDURE — 1036F TOBACCO NON-USER: CPT | Performed by: SURGERY

## 2024-10-22 PROCEDURE — 3008F BODY MASS INDEX DOCD: CPT | Performed by: SURGERY

## 2024-10-22 PROCEDURE — 99024 POSTOP FOLLOW-UP VISIT: CPT | Performed by: SURGERY

## 2024-10-27 NOTE — PROGRESS NOTES
"       PLASTIC SURGERY CLINIC VISIT  POSTOP BREAST RECONSTRUCTION     Date: 10/29/24  Date of Surgery: 10/10/24  Surgical Procedure: Bilateral Mammaplasty         HPI:   Kassi Edmonds 34 y.o. female is here for post-operative appointment for the above procedure(s).      Interval changes as of this date:   10/22 Prevena in place and functioning. GIOVANNA drains intact with SS output of less than 30 ml bilaterally. Irritated by the drains. Pain is well controlled. Endorses adequate protein intake and activity restrictions.   10/29 Doing well overall. Endorses adequate protein intake and activity restrictions.  Notes some dehiscence and drainage on right breast in superior lateral incision     MEDICATIONS    Current Outpatient Medications:     amphetamine-dextroamphetamine XR (Adderall XR) 5 mg 24 hr capsule, Take 1 capsule (5 mg) by mouth once daily in the morning. Do not crush or chew., Disp: 30 capsule, Rfl: 0    cetirizine (ZyrTEC) 5 mg tablet, Take 1 tablet (5 mg) by mouth once daily., Disp: , Rfl:     gabapentin (Neurontin) 300 mg capsule, Take 1 capsule (300 mg) by mouth every 8 hours for 14 days., Disp: 42 capsule, Rfl: 0      OBJECTIVE [x]Expand by Default  Blood pressure 116/65, pulse 65, height 1.6 m (5' 3\"), weight 64.4 kg (142 lb).      REVIEW OF SYSTEMS:    Constitutional: negative for fevers, chills, unintentional weight loss  HEENT: negative for changes in vision, headaches, changes in hearing, congestion, sore throat  Cardiovascular: negative for chest pain, palpitations  Respiratory: negative for cough, shortness of breath  Gastrointestinal: negative for nausea, vomiting, diarrhea  Genitourinary: negative for dysuria, hematuria  Musculoskeletal: negative for joint swelling or pain  Skin: negative for rashes or lesions  Psych: negative for depression, anxiety  Endocrine: negative for polyuria, polydipsia, cold/heat intolerance  Hem/Lymph: negative for bleeding disorder     PHYSICAL EXAM  General: alert and " oriented, no apparent distress    Focused exam of the breasts:  Right: Incisions healing.  Suture granuloma x 2 in superior lateral incision  Left: Incisions c/d/i      ASSESSMENT/PLAN  Kassi Edmonds 34 y.o. female who had Bilateral Mammaplasty on 10/10/24  who presents for POV.  Suture granuloma's explored and suture knots removed.          Continue activity restrictions.   Continue adequate protein intake.       Attending Attestation:  IAntoinette MD, personal performed the history, exam, and decision making on this patient.

## 2024-10-29 ENCOUNTER — APPOINTMENT (OUTPATIENT)
Dept: PLASTIC SURGERY | Facility: CLINIC | Age: 34
End: 2024-10-29
Payer: COMMERCIAL

## 2024-10-29 VITALS
SYSTOLIC BLOOD PRESSURE: 116 MMHG | WEIGHT: 142 LBS | DIASTOLIC BLOOD PRESSURE: 65 MMHG | HEART RATE: 65 BPM | BODY MASS INDEX: 25.16 KG/M2 | HEIGHT: 63 IN

## 2024-10-29 DIAGNOSIS — F64.0 GENDER DYSPHORIA IN ADULT: Primary | ICD-10-CM

## 2024-10-29 DIAGNOSIS — G89.18 ACUTE POSTOPERATIVE PAIN: ICD-10-CM

## 2024-10-29 PROCEDURE — 99024 POSTOP FOLLOW-UP VISIT: CPT | Performed by: SURGERY

## 2024-10-29 PROCEDURE — 3008F BODY MASS INDEX DOCD: CPT | Performed by: SURGERY

## 2024-10-29 RX ORDER — MUPIROCIN 20 MG/G
OINTMENT TOPICAL
Qty: 15 G | Refills: 0 | Status: SHIPPED | OUTPATIENT
Start: 2024-10-29 | End: 2024-11-12

## 2024-10-29 NOTE — OP NOTE
"Bilateral Breast Mammaplasty (B) Operative Note     Date: 10/10/2024  OR Location: [facility] A OR    Name: Kassi Edmonds \"[place]\", : 1990, Age: 34 y.o., MRN: [id number], Sex: female    Diagnosis  Pre-op Diagnosis      * Gender dysphoria in adult [F64.0] Post-op Diagnosis     * Gender dysphoria in adult [F64.0]     Procedures  Bilateral Breast Mammaplasty   - PA BREAST REDUCTION      Surgeons      * Antoinetet Dudley - Primary    Resident/Fellow/Other Assistant:  Surgeons and Role:     * Laurence Saba PA-C - HERNANDEZ First Assist  There was no skilled surgical resident assistance available.  The assistance of Laurence Saba PA-C was essential for retraction, hemostasis and closure.      Procedure Summary  Anesthesia: General  ASA: II  Anesthesia Staff: Anesthesiologist: José Miguel Kenny MD  CRNA: MENDY Dia-CRNA  Estimated Blood Loss: 25 mL  Intra-op Medications:   Administrations occurring from 0730 to 1130 on 10/10/24:   Medication Name Total Dose   sodium chloride 0.9 % irrigation solution 1,000 mL   BUPivacaine-EPINEPHrine (Marcaine w/EPI) 36 mL in sodium chloride 0.9% 90 mL syringe 126 mL   acetaminophen (Tylenol) tablet 975 mg 975 mg   gabapentin (Neurontin) capsule 600 mg 600 mg   ceFAZolin (Ancef) vial 1 g 2 g   dexAMETHasone (Decadron) injection 4 mg/mL 8 mg   fentaNYL (Sublimaze) injection 50 mcg/mL 100 mcg   HYDROmorphone (Dilaudid) injection 1 mg/mL 0.5 mg   ketorolac (Toradol) 30 mg 30 mg   lidocaine (Xylocaine) injection 2 % 80 mg   midazolam PF (Versed) injection 1 mg/mL 2 mg   ondansetron (Zofran) 2 mg/mL injection 4 mg   propofol (Diprivan) injection 10 mg/mL 979.06 mg   rocuronium (ZeMuron) 50 mg/5 mL injection 70 mg   scopolamine (Transderm-Scop) patch 1 patch 1 patch   NaCl 0.9 % infusion 450 mL              Anesthesia Record               Intraprocedure I/O Totals       None           Specimen:   ID Type Source Tests Collected by Time   1 : RIGHT BREAST Tissue BREAST REDUCTION " MAMMOPLASTY RIGHT SURGICAL PATHOLOGY EXAM Antoinette Dudley MD 10/10/2024 0908   2 : LEFT BREAST Tissue BREAST REDUCTION MAMMOPLASTY LEFT SURGICAL PATHOLOGY EXAM Antoinette Dudley MD 10/10/2024 1028        Staff:   Tanishaulator: Veronica  Circulator: Esthela  Scrub Person: Florence  Scrub Person: Karely Mcclendon Scrub: Peg         Drains and/or Catheters:   Closed/Suction Drain 2 Inferior;Right Breast Bulb 19 Fr. (Active)   Dressing Status Clean;Dry 10/10/24 1245   Drainage Appearance Bloody 10/10/24 1245   Status To bulb suction 10/10/24 1245       Closed/Suction Drain 1 Left Breast Bulb 19 Fr. (Active)   Dressing Status Clean;Dry 10/10/24 1245   Drainage Appearance Bloody 10/10/24 1245   Status To bulb suction 10/10/24 1140       Tourniquet Times:         Indications: Joshua Edmonds is an 34 y.o. gender nonconforming who is having surgery for Gender dysphoria in adult [F64.0]. Preferred prounouns are she/they/them.  The patient will be getting bilateral breast reduction to masculinize the chest.    The patient was seen in the preoperative area. The risks, benefits, complications, treatment options, non-operative alternatives, expected recovery and outcomes were discussed with the patient.  The risks of the procedure were discussed with her prior to surgery.  These include but are not limited to the risks of anesthesia, infection, bleeding, pain, need for further procedures, hematoma, seroma, wound healing complications, and asymmetry.  The possibilities of reaction to medication, pulmonary aspiration, injury to surrounding structures, bleeding, recurrent infection, the need for additional procedures, failure to diagnose a condition, and creating a complication requiring transfusion or operation were discussed with the patient. The patient concurred with the proposed plan, giving informed consent.  The site of surgery was properly noted/marked if necessary per policy. The patient has been actively warmed in preoperative area.  Preoperative antibiotics have been ordered and given within 1 hours of incision. Venous thrombosis prophylaxis have been ordered including bilateral sequential compression devices    Procedure Details: The patient was identified and marked in the upright and standing position.  She was taken to the operative room and placed in the supine position.  A preoperative time was performed identifying the patient, procedeure and laterality.  An atraumatic endotracheal intubation was performed by the anesthesia team.  Her arms were padded and carefully secured to the arm boards, abducted less than 90 degrees. She was prepped and draped in the usual sterile fashion.     I started first on the right breast.  I made a 7 cm IMF incision sharply and dissected the breast tissue off the breast skin, leaving approximately 5 to 7.5 mm of subcutaneous tissue.  I dissected the breast mound off the skin and carried the dissection laterally to define the lateral edge of the pectoralis.  This dissection was carried out superiorly to the anterior axillary line.  I then removed the tissue off the chest wall.  I performed hemostasis and irrigated the pocket.  I placed a 19 Fr Lewis drain.  Then I performed the same procedure on the left side.  The weight of the right specimen was 67.3 gm and the left was 81.2 gm.  The skin of the breast retracted sufficiently and did not require any further excision.  Of note while doing the dissection on the right side, there was a thermal injury caused by the electrocautery getting hemostasis on parenchyma of the superior lateral pole.  Since this was a thermal injury from below, I marked and sharply excised this area and primarily closed it as a linear incision.  All of the incisions were closed in multiple layers with interrupted buried 3-0 Monocryls in the parenchyma as well as dermis followed by a running 4-0 monocryl subcuticular.  A Griselda Provena was placed on each side with a CHG tegaderm dressing  over the drains.  The patient tolerated the procedure well.  She was woken up and extubated and taken to the recovery room in good condition.      Complications:  None; patient tolerated the procedure well.    Disposition: PACU - hemodynamically stable.  Condition: stable     Attending Attestation: I performed the procedure.    Antoinette Dudley  Phone Number: [phone]

## 2024-11-05 ENCOUNTER — TELEPHONE (OUTPATIENT)
Dept: PRIMARY CARE | Facility: CLINIC | Age: 34
End: 2024-11-05

## 2024-11-05 ENCOUNTER — APPOINTMENT (OUTPATIENT)
Dept: PLASTIC SURGERY | Facility: CLINIC | Age: 34
End: 2024-11-05
Payer: COMMERCIAL

## 2024-11-05 DIAGNOSIS — F90.0 ATTENTION DEFICIT HYPERACTIVITY DISORDER (ADHD), PREDOMINANTLY INATTENTIVE TYPE: Primary | ICD-10-CM

## 2024-11-05 RX ORDER — DEXTROAMPHETAMINE SACCHARATE, AMPHETAMINE ASPARTATE, DEXTROAMPHETAMINE SULFATE AND AMPHETAMINE SULFATE 1.25; 1.25; 1.25; 1.25 MG/1; MG/1; MG/1; MG/1
5 TABLET ORAL 2 TIMES DAILY
Qty: 45 TABLET | Refills: 0 | Status: SHIPPED | OUTPATIENT
Start: 2024-11-05 | End: 2024-12-05

## 2024-11-08 NOTE — PROGRESS NOTES
"       PLASTIC SURGERY CLINIC VISIT  POSTOP BREAST RECONSTRUCTION     Date: 11/12/24  Date of Surgery: 10/10/24  Surgical Procedure: Bilateral Mammaplasty         HPI:   Kassi GARCIA Edmonds 34 y.o. female is here for post-operative appointment for the above procedure(s).      Interval changes as of this date:   10/22 Prevena in place and functioning. GIOVANNA drains intact with SS output of less than 30 ml bilaterally. Irritated by the drains. Pain is well controlled. Endorses adequate protein intake and activity restrictions.   10/29 Doing well overall. Endorses adequate protein intake and activity restrictions.  Notes some dehiscence and drainage on right breast in superior lateral incision  11/12 Pt called on 11/8 with concern of hernia    MEDICATIONS    Current Outpatient Medications:     amphetamine-dextroamphetamine (Adderall) 5 mg tablet, Take 1 tablet (5 mg) by mouth 2 times a day., Disp: 45 tablet, Rfl: 0    cetirizine (ZyrTEC) 5 mg tablet, Take 1 tablet (5 mg) by mouth once daily., Disp: , Rfl:     gabapentin (Neurontin) 300 mg capsule, Take 1 capsule (300 mg) by mouth every 8 hours for 14 days., Disp: 42 capsule, Rfl: 0    mupirocin (Bactroban) 2 % ointment, Apply topically once daily (M-F) for 14 days., Disp: 15 g, Rfl: 0      OBJECTIVE [x]Expand by Default  Blood pressure 118/67, pulse 73, height 1.6 m (5' 3\"), weight 64.4 kg (142 lb).      REVIEW OF SYSTEMS:    Constitutional: negative for fevers, chills, unintentional weight loss  HEENT: negative for changes in vision, headaches, changes in hearing, congestion, sore throat  Cardiovascular: negative for chest pain, palpitations  Respiratory: negative for cough, shortness of breath  Gastrointestinal: negative for nausea, vomiting, diarrhea  Genitourinary: negative for dysuria, hematuria  Musculoskeletal: negative for joint swelling or pain  Skin: negative for rashes or lesions  Psych: negative for depression, anxiety  Endocrine: negative for polyuria, polydipsia, " cold/heat intolerance  Hem/Lymph: negative for bleeding disorder     PHYSICAL EXAM  General: alert and oriented, no apparent distress    Focused exam of the breasts:  Right: Incisions healing well  Left: Incisions c/d/i   There is limitted ROM on the right side with deeper tissue tethering in the anterior/exterior side area      ASSESSMENT/PLAN  Kassi Edmonds 34 y.o. female who had Bilateral Mammaplasty on 10/10/24  who presents for POV.  Suture granuloma's explored and suture knots removed.  Doing well overall. Has a hernia, this is new, advised to speak to PCP to get referral. There is limited ROM on the right side with deeper tissue tethering in the ant/exterior side area, will refer her to PT. She is interested in nipple reduction, will discuss this down the line.     Continue activity restrictions.   Continue adequate protein intake.   Will complete photos on next visit  Referral to PT, for limited ROM  May return for light duty      RTC 12/17/24 to assess and allow return to work without restrictions.      Scribe Attestation  By signing my name below, I, Cynthia Hirsch, attest that this documentation  has been prepared under the direction and in the presence of Antoinette Dudley MD.

## 2024-11-12 ENCOUNTER — APPOINTMENT (OUTPATIENT)
Dept: PLASTIC SURGERY | Facility: CLINIC | Age: 34
End: 2024-11-12
Payer: COMMERCIAL

## 2024-11-12 ENCOUNTER — TELEPHONE (OUTPATIENT)
Dept: PRIMARY CARE | Facility: CLINIC | Age: 34
End: 2024-11-12

## 2024-11-12 VITALS
HEIGHT: 63 IN | DIASTOLIC BLOOD PRESSURE: 67 MMHG | BODY MASS INDEX: 25.16 KG/M2 | SYSTOLIC BLOOD PRESSURE: 118 MMHG | HEART RATE: 73 BPM | WEIGHT: 142 LBS

## 2024-11-12 DIAGNOSIS — F64.0 GENDER DYSPHORIA IN ADULT: Primary | ICD-10-CM

## 2024-11-12 DIAGNOSIS — K43.2 INCISIONAL HERNIA, WITHOUT OBSTRUCTION OR GANGRENE: Primary | ICD-10-CM

## 2024-11-12 DIAGNOSIS — G89.18 ACUTE POSTOPERATIVE PAIN: ICD-10-CM

## 2024-11-12 PROCEDURE — 3008F BODY MASS INDEX DOCD: CPT | Performed by: SURGERY

## 2024-11-12 PROCEDURE — 99024 POSTOP FOLLOW-UP VISIT: CPT | Performed by: SURGERY

## 2024-11-12 NOTE — LETTER
November 14, 2024     Patient: Kassi Edmonds   YOB: 1990   Date of Visit: 11/12/2024       To Whom It May Concern:    It is my medical opinion that Kassi may return to light duty work beginning 11/25/24 with the following restrictions: no pushing, pulling, or lifting over 5 lbs.  She may return to full duty on 1/6/25 with no restrictions.     If you have any questions or concerns, please don't hesitate to call.         Sincerely,        Antoinette Dudley MD    CC: No Recipients

## 2024-11-21 ENCOUNTER — EVALUATION (OUTPATIENT)
Dept: PHYSICAL THERAPY | Facility: CLINIC | Age: 34
End: 2024-11-21
Payer: COMMERCIAL

## 2024-11-21 DIAGNOSIS — M79.601 PAIN IN BOTH UPPER EXTREMITIES: ICD-10-CM

## 2024-11-21 DIAGNOSIS — M79.602 PAIN IN BOTH UPPER EXTREMITIES: ICD-10-CM

## 2024-11-21 DIAGNOSIS — G89.18 ACUTE POST-OPERATIVE PAIN: ICD-10-CM

## 2024-11-21 DIAGNOSIS — G89.18 ACUTE POSTOPERATIVE PAIN: ICD-10-CM

## 2024-11-21 DIAGNOSIS — F64.0 GENDER DYSPHORIA IN ADULT: Primary | ICD-10-CM

## 2024-11-21 PROCEDURE — 97110 THERAPEUTIC EXERCISES: CPT | Mod: GP | Performed by: PHYSICAL THERAPIST

## 2024-11-21 PROCEDURE — 97161 PT EVAL LOW COMPLEX 20 MIN: CPT | Mod: GP | Performed by: PHYSICAL THERAPIST

## 2024-11-21 ASSESSMENT — ENCOUNTER SYMPTOMS
LOSS OF SENSATION IN FEET: 0
DEPRESSION: 0
OCCASIONAL FEELINGS OF UNSTEADINESS: 0

## 2024-11-21 NOTE — PROGRESS NOTES
Initial evaluation  Physical Therapy Initial Evaluation    Patient Name:Kassi Edmonds  MRN:[id number]  Today's Date:11/21/2024  Referred by: Antoinette Dudley  Time Calculation  Start Time: 1520  Stop Time: 1600  Time Calculation (min): 40 min    Therapy Diagnosis  1. Gender dysphoria in adult    2. Acute postoperative pain    3. Acute post-operative pain    4. Pain in both upper extremities       Plan of Care  Planned interventions include PRN: therapeutic exercise, manual therapy, electrical stimulation, hot pack, vasopneumatic device with cold, HEP training.   Frequency and duration: 1-2 time(s) a week, for  6-8 weeks or 12 visits .   Plan of care was developed with input and agreement by the patient.   Plan for next session: manual therapy/ PROM/AAROM/gentle strengthening    Assessment  Problem List: Pain, range of motion/joint mobility, strength, activity limitations, ADLs/IADLs/self care skills, decreased functional level, decreased knowledge of HEP, edema, flexibility, and posture.     Patient is a 34 y.o. female who presents with complaint of bilateral UE ROM limitation due to post-op restrictions . Standardized testing and measures administered today reveal that the patient has multiple impairments in body structures and functions, activity limitations, and participation restrictions. These include subjective and objective findings such as pain, tenderness to palpation of the affected area, decreased ROM, strength, flexibility, and function. The patient's impairments are likely influenced by mechanical dysfunction and deconditioning with possible overuse and degenerative changes. Skilled PT services are warranted in order to realize measurable and meaningful change in the above outcome measures and achieve improvements in the patient's functional status and individual goals.    Rehab Potential:good  Clinical Presentation: Stable and/or uncomplicated characteristics.   Evaluation Complexity: Low      Precautions/Fall Risk:*none Pacemaker no  Seizures No  Post Op Movement/Restrictions No current restrictions (except for work)    Insurance  Visit number: 1   Approved number of visits: Unlimited  Onset Date: 10/10/2024  Certification Period:  Beginnin2024            Ending: ??  Payor: [employer] [facility] / Plan: [employer] / Product Type: *No Product type* /     Subjective  Chief complaint/reason for visit: Patient is a 34 year old s/p bilateral mammoplasty on 10/10 with postoperative pain and UE ROM restriction. They are here today for physical therapy to regain normal ROM functional and strength. They currently do not have any restrictions but have a physical job and is not currently working due to presenting condition. They are seeing general surgery for newly presenting substernal hernia next week.   Mechanism of Injury:   post-surgical  Location of Pain: anterior chest wall and toward pec insertion   Current Pain Level (0-10): 0   High Pain Level (0-10): 4   Low Pain Level (0-10): 0  Pain Quality: aching and tightness  Pain Exacerbating Factors: raising arm overhead, overhead work, overuse  Pain Relieving Factors: heat    Medical Screening: Reviewed medical history form with patient and medical screening assessed.   Red Flags: Do you have any of the following? No  Fever/chills, unexplained weight changes, dizziness/fainting, unexplained change in bowel or bladder functions, unexplained malaise or muscle weakness, night pain/sweats, numbness or tingling  Current Medical Management: Ortho  Prior Level of Function (PLOF)  Patient previously independent with all ADLs  Exercise/Physical Activity: Crossfit / Paramedic  Functional limitations: decreased positional tolerances to bending, driving, reaching, self-care activities, work related tasks, push/pull activities, participation in home management/duties, participation in leisure activities and athletics.  Work Status: full  time job doing paramedic on FMLA  Current Status: improved  Patient Awareness: Patient is aware of  her diagnosis and prognosis.   Living Environment: house  Social Support: spouse / significant other  Personal Factors That May Impact Care: None  Patient's Goal for Treatment: relieving pain, increasing strength, increasing mobility, improving positional tolerances, reducing symptoms, returning to work, and resuming athletics/activities .     Objective  Other Measures  Disability of Arm Shoulder Hand (DASH): 50%     Upper Extremity Objective:  Observation/Posture: mildly rounded shoulder posture   Palpation: TTP pec insertion on R, TTP inferior chest wall insertion, lat insertion   AROM (tested in supine):   R/L shoulder flexion AROM (degrees): 127 deg with pain // 145 deg with pain  R/L shoulder extension AROM (degrees):  R/L shoulder abduction AROM (degrees): 120 deg with pain // 130 deg   R/L shoulder ER AROM (degrees): neutral WFL / ABD 50 deg with pain  // neutral WFL / ABD 60 deg with pain  R/L shoulder IR AROM (degrees): WFL //WFL  R/L elbow AROM (degrees): WNL  MMT:   R/L shoulder flexion strength (MMT): 4+/5 //4+/5   R/L shoulder abduction strength (MMT): 4+/5 // 4+/5   R/L shoulder ER strength (MMT): 4/5 // 4/5  R/L shoulder IR strength (MMT): 4+/5 // 4+/5  R/L elbow flex/ext (MMT): WFL     Treatment Performed Today: Initial evaluation and patient education regarding diagnosis, prognosis, contributing factors, comorbidities, importance and instruction of HEP, role of PT, postural re-education, activity modification, and body mechanics.    Therapeutic Exercise 15 minutes  Education/Resources provided today: Home Program   [employer]: Provided, reviewed, and performed the following therapeutic exercises with the patient:  Access Code: [email or web address]  URL: [email or web address]  Date: 11/21/2024  Prepared by: Dionna Rojas DPT    Exercises  - Seated Scapular Retraction  - 1 x daily - 7 x weekly  - 3 sets - 10 reps - 5 hold  - Supine Shoulder Flexion AAROM with Hands Clasped  - 1 x daily - 7 x weekly - 3 sets - 10 reps  - Supine Shoulder Flexion Extension AAROM with Dowel  - 1 x daily - 7 x weekly - 3 sets - 10 reps  - Seated Shoulder Abduction Towel Slide at Table Top  - 1 x daily - 7 x weekly - 3 sets - 10 reps  - Seated Shoulder Flexion Towel Slide at Table Top  - 1 x daily - 7 x weekly - 3 sets - 10 reps  - Supine Shoulder External Rotation in 45 Degrees Abduction AAROM with Dowel  - 1 x daily - 7 x weekly - 3 sets - 10 reps  - Standing Shoulder Extension with Dowel  - 1 x daily - 7 x weekly - 3 sets - 10 reps  - Supine Chest Stretch with Elbows Bent  - 1 x daily - 7 x weekly - 3 sets - 10 reps  - Corner Pec Minor Stretch  - 1 x daily - 7 x weekly - 3 sets - 10 reps    Modalities   Vasopneumatic Device       minutes  Electrical Stimulation          minutes    Response to Treatment: improved knowledge and understanding of condition, decreased pain, improved joint mobility/ROM, improved strength, improved flexibility, improved tissue mobility, improved posture.    Goals: Goals set and discussed today.   Upper Extremity Goals  At time of discharge, the patient will:  1) Perform HEP independently within the clinic to show adherence to initial program for symptom management.  2) Patient will report a reduction in pain at its worst from 4/10 to 0/10 to allow patient to perform essential ADLs and iADLs at PLOF.  3) Demonstrate proper scapular position and posture for improved positional tolerances to sitting and standing > 60 mins/hrs  4) Increase ROM of bilateral UE to WNL in order to improve the ability to perform essential OH, self-care and ADLs  5) Decrease score of Quick Dash by > 8 points to meet MCID, demonstrating a clinically significant improvement in function.  6) Patient will tolerate laying on affected shoulder with unrestricted sleep for 6-8 hours.  7) Patient will demonstrate Apley reach to T6  bilaterally of affected shoulder to don/doff clothing without difficulty.  8) Patient will demonstrate functional strength of the affected shoulder to perform lift of 8-12 pounds for groceries and laundry duties.   9) Patient will demonstrate pain free strength of 5/5 for all affected shoulder groups for return to unrestricted lifting activities.  10) Be able to perform the ADL of returning to lifting unrestricted without an increase or production of symptoms for RTW duties.  Patient stated goal: restore full ROM of both UE

## 2024-11-26 ENCOUNTER — OFFICE VISIT (OUTPATIENT)
Dept: SURGERY | Facility: HOSPITAL | Age: 34
End: 2024-11-26
Payer: COMMERCIAL

## 2024-11-26 VITALS — HEART RATE: 72 BPM | SYSTOLIC BLOOD PRESSURE: 126 MMHG | TEMPERATURE: 97.7 F | DIASTOLIC BLOOD PRESSURE: 76 MMHG

## 2024-11-26 DIAGNOSIS — K43.9 EPIGASTRIC HERNIA: Primary | ICD-10-CM

## 2024-11-26 DIAGNOSIS — K43.2 INCISIONAL HERNIA, WITHOUT OBSTRUCTION OR GANGRENE: ICD-10-CM

## 2024-11-26 PROCEDURE — 1036F TOBACCO NON-USER: CPT | Performed by: SURGERY

## 2024-11-26 PROCEDURE — 99213 OFFICE O/P EST LOW 20 MIN: CPT | Performed by: SURGERY

## 2024-11-26 PROCEDURE — 99203 OFFICE O/P NEW LOW 30 MIN: CPT | Performed by: SURGERY

## 2024-11-26 RX ORDER — CEFAZOLIN SODIUM 2 G/100ML
2 INJECTION, SOLUTION INTRAVENOUS ONCE
OUTPATIENT
Start: 2024-11-26 | End: 2024-11-26

## 2024-11-26 ASSESSMENT — PAIN SCALES - GENERAL: PAINLEVEL_OUTOF10: 0-NO PAIN

## 2024-11-26 NOTE — LETTER
"November 26, 2024     Ryanne Jordan, APRN-CNP  [address]  [address]  [facility] [address]    Patient: Joshua Edmonds   YOB: 1990   Date of Visit: 11/26/2024       Dear Dr. Ryanne Jordan, APRN-CNP:    Thank you for referring Joshua Edmonds to me for evaluation. Below are my notes for this consultation.  If you have questions, please do not hesitate to call me. I look forward to following your patient along with you.       Sincerely,     Catracho Choi MD      CC: No Recipients  ______________________________________________________________________________________    History Of Present Illness  Kassi Edmonds \"Diane" is a 34 y.o. female presenting with painful subxiphoid bulge they noticed after recent mammoplasty surgery.  They were able to reduce it and that helped alleviate some of the discomfort.  Saw the plastic surgeon for routine follow-up and was noted to have a hernia.  Referred to general surgery clinic for definitive management.  Otherwise no major medical problems.  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     Past Medical History  Past Medical History:   Diagnosis Date   • Delayed emergence from general anesthesia        Surgical History  Past Surgical History:   Procedure Laterality Date   • TUBAL LIGATION          Social History  She reports that she has never smoked. She has never been exposed to tobacco smoke. She has never used smokeless tobacco. She reports current alcohol use. She reports that she does not currently use drugs after having used the following drugs: Marijuana.    Family History  No family history on file.     Allergies  Cat hair standardized allergenic extract    Review of Systems  Constitutional: no weight loss, no fevers, no malaise  HEENT: negative  Neck: negative  Pulmonary: no SOB, no cough  CV: no chest pain, otherwise negative  GI: no pain, no diarrhea, no bloody stools, no constipation  : no hematuria, retention.  MS: no " aches/pains  Neurologic: negative  Skin: no rashes, lesions  HEME: no bleeding tendency, no bruising  Psych: no mood issues    Physical Exam  General: well appearing, no acute distress, well nourished  HEENT: normal  Neck: supple  Pulmonary: lungs clear to auscultation bilaterally  CV: RR, S1S2, no murmurs.  Pulses palpable and equal.  Good capillary refill  Abdomen: soft, tender nodule in the epigastrium consistent with congenital epigastric hernia  : grossly normal external genitalia  MS: grossly normal  Neurologic: alert and oriented, strength/sensation intact  Skin: non jaundiced, healing chest wall incisions  Psych: mood appropriate    Last Recorded Vitals  There were no vitals taken for this visit.    Relevant Results           Assessment/Plan      Impression: Small chronically incarcerated subxiphoid epigastric hernia    -I carefully reviewed pathophysiology of incisional hernias with patient  -Risks of eventual incarceration/strangulation, worsening symptomatology described  -Rationale for surgical repair outlined  -Techniques of repair described (laparoscopy vs open)  -Risks of surgery addressed (bleeding, infection, bladder/bowel injury, chronic pain syndromes, recurrence, etc)  -Expected recovery timeline conveyed (2-4 weeks of limited activity, work restrictions, need for pain medications, etc)  -Other option would be watchful waiting, avoidance of activity that worsens symptoms  -Patient has indicated to me a verbal understanding of all this information and would like to proceed with open likely primary repair of the hernia.    -Office will schedule at patient convenience       I spent 35 minutes in the professional and overall care of this patient.      Catracho Choi MD

## 2024-11-26 NOTE — PROGRESS NOTES
"History Of Present Illness  Kassi Edmonds \"Joshua\" is a 34 y.o. female presenting with painful subxiphoid bulge they noticed after recent mammoplasty surgery.  They were able to reduce it and that helped alleviate some of the discomfort.  Saw the plastic surgeon for routine follow-up and was noted to have a hernia.  Referred to general surgery clinic for definitive management.  Otherwise no major medical problems.  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     Past Medical History  Past Medical History:   Diagnosis Date    Delayed emergence from general anesthesia        Surgical History  Past Surgical History:   Procedure Laterality Date    TUBAL LIGATION          Social History  She reports that she has never smoked. She has never been exposed to tobacco smoke. She has never used smokeless tobacco. She reports current alcohol use. She reports that she does not currently use drugs after having used the following drugs: Marijuana.    Family History  No family history on file.     Allergies  Cat hair standardized allergenic extract    Review of Systems  Constitutional: no weight loss, no fevers, no malaise  HEENT: negative  Neck: negative  Pulmonary: no SOB, no cough  CV: no chest pain, otherwise negative  GI: no pain, no diarrhea, no bloody stools, no constipation  : no hematuria, retention.  MS: no aches/pains  Neurologic: negative  Skin: no rashes, lesions  HEME: no bleeding tendency, no bruising  Psych: no mood issues    Physical Exam  General: well appearing, no acute distress, well nourished  HEENT: normal  Neck: supple  Pulmonary: lungs clear to auscultation bilaterally  CV: RR, S1S2, no murmurs.  Pulses palpable and equal.  Good capillary refill  Abdomen: soft, tender nodule in the epigastrium consistent with congenital epigastric hernia  : grossly normal external genitalia  MS: grossly normal  Neurologic: alert and oriented, strength/sensation intact  Skin: non jaundiced, " healing chest wall incisions  Psych: mood appropriate    Last Recorded Vitals  There were no vitals taken for this visit.    Relevant Results           Assessment/Plan       Impression: Small chronically incarcerated subxiphoid epigastric hernia    -I carefully reviewed pathophysiology of incisional hernias with patient  -Risks of eventual incarceration/strangulation, worsening symptomatology described  -Rationale for surgical repair outlined  -Techniques of repair described (laparoscopy vs open)  -Risks of surgery addressed (bleeding, infection, bladder/bowel injury, chronic pain syndromes, recurrence, etc)  -Expected recovery timeline conveyed (2-4 weeks of limited activity, work restrictions, need for pain medications, etc)  -Other option would be watchful waiting, avoidance of activity that worsens symptoms  -Patient has indicated to me a verbal understanding of all this information and would like to proceed with open likely primary repair of the hernia.    -Office will schedule at patient convenience       I spent 35 minutes in the professional and overall care of this patient.      Catracho Choi MD

## 2024-12-03 ENCOUNTER — APPOINTMENT (OUTPATIENT)
Dept: SURGERY | Facility: HOSPITAL | Age: 34
End: 2024-12-03
Payer: COMMERCIAL

## 2024-12-09 NOTE — PROGRESS NOTES
PLASTIC SURGERY CLINIC VISIT  POSTOP BREAST RECONSTRUCTION     Date: 12/12/24  Date of Surgery: 10/10/24  Surgical Procedure: Bilateral Gender Confirming Chest Masculization Mammaplasty         HPI:   Joshua Edmonds 34 y.o. gender binary (cis female) here for post-operative appointment for the above procedure(s).      Interval changes as of this date:   10/22 Prevena in place and functioning. GIOVANNA drains intact with SS output of less than 30 ml bilaterally. Irritated by the drains. Pain is well controlled. Endorses adequate protein intake and activity restrictions.   10/29 Doing well overall. Endorses adequate protein intake and activity restrictions.  Notes some dehiscence and drainage on right breast in superior lateral incision  11/12 Pt called on 11/8 with concern of hernia, scheduled with Dr. PATRICIO Choi for repair 12/26/2024 12/12 Pt was referred to PT; had evaluation on 11/21/24.     MEDICATIONS    Current Outpatient Medications:     amphetamine-dextroamphetamine (Adderall) 5 mg tablet, Take 1 tablet (5 mg) by mouth 2 times a day., Disp: 45 tablet, Rfl: 0    cetirizine (ZyrTEC) 5 mg tablet, Take 1 tablet (5 mg) by mouth once daily., Disp: , Rfl:     gabapentin (Neurontin) 300 mg capsule, Take 1 capsule (300 mg) by mouth every 8 hours for 14 days., Disp: 42 capsule, Rfl: 0      OBJECTIVE [x]Expand by Default  There were no vitals taken for this visit.      REVIEW OF SYSTEMS:    Constitutional: negative for fevers, chills, unintentional weight loss  HEENT: negative for changes in vision, headaches, changes in hearing, congestion, sore throat  Cardiovascular: negative for chest pain, palpitations  Respiratory: negative for cough, shortness of breath  Gastrointestinal: negative for nausea, vomiting, diarrhea  Genitourinary: negative for dysuria, hematuria  Musculoskeletal: negative for joint swelling or pain  Skin: negative for rashes or lesions  Psych: negative for depression, anxiety  Endocrine: negative for  polyuria, polydipsia, cold/heat intolerance  Hem/Lymph: negative for bleeding disorder     PHYSICAL EXAM  General: alert and oriented, no apparent distress    Focused exam of the breasts:  Right: Incisions healing well  Left: Incisions c/d/i   There is limitted ROM on the right side with deeper tissue tethering in the anterior/exterior side area      ASSESSMENT/PLAN  Joshua Edmonds 34 y.o. gender binary (cis female) here for post-operative appointment    We would like to continue with the Gender Confirming Chest Masculization by next performing Nipple/Areola reduction reconstruction. Joshua will call his insurance with CPT code 00992 (to accompany CPT code 99832) to confirm insurance coverage.  Once confirmed, Dr. Dudley will proceed with scheduling this medically neccessary procedure   3. Continue activity restrictions.   4. Continue adequate protein intake.   5. Photos obtained at this visit   6. Continue wearing binder for full 6 weeks    It was a pleasure to see you today

## 2024-12-10 ENCOUNTER — APPOINTMENT (OUTPATIENT)
Dept: GENETICS | Facility: CLINIC | Age: 34
End: 2024-12-10
Payer: COMMERCIAL

## 2024-12-10 ENCOUNTER — APPOINTMENT (OUTPATIENT)
Dept: PLASTIC SURGERY | Facility: CLINIC | Age: 34
End: 2024-12-10
Payer: COMMERCIAL

## 2024-12-12 ENCOUNTER — APPOINTMENT (OUTPATIENT)
Dept: PLASTIC SURGERY | Facility: CLINIC | Age: 34
End: 2024-12-12
Payer: COMMERCIAL

## 2024-12-12 DIAGNOSIS — F64.0 GENDER DYSPHORIA IN ADULT: Primary | ICD-10-CM

## 2024-12-12 PROCEDURE — 99024 POSTOP FOLLOW-UP VISIT: CPT | Performed by: PHYSICIAN ASSISTANT

## 2024-12-26 ENCOUNTER — ANESTHESIA (OUTPATIENT)
Dept: OPERATING ROOM | Facility: CLINIC | Age: 34
End: 2024-12-26
Payer: COMMERCIAL

## 2024-12-26 ENCOUNTER — ANESTHESIA EVENT (OUTPATIENT)
Dept: OPERATING ROOM | Facility: CLINIC | Age: 34
End: 2024-12-26
Payer: COMMERCIAL

## 2024-12-26 ENCOUNTER — HOSPITAL ENCOUNTER (OUTPATIENT)
Facility: CLINIC | Age: 34
Setting detail: OUTPATIENT SURGERY
Discharge: HOME | End: 2024-12-26
Attending: SURGERY | Admitting: SURGERY
Payer: COMMERCIAL

## 2024-12-26 VITALS
HEIGHT: 63 IN | OXYGEN SATURATION: 100 % | RESPIRATION RATE: 16 BRPM | SYSTOLIC BLOOD PRESSURE: 122 MMHG | TEMPERATURE: 98.6 F | WEIGHT: 140.65 LBS | DIASTOLIC BLOOD PRESSURE: 63 MMHG | HEART RATE: 55 BPM | BODY MASS INDEX: 24.92 KG/M2

## 2024-12-26 DIAGNOSIS — K43.9 EPIGASTRIC HERNIA: Primary | ICD-10-CM

## 2024-12-26 PROCEDURE — 2500000005 HC RX 250 GENERAL PHARMACY W/O HCPCS: Performed by: SURGERY

## 2024-12-26 PROCEDURE — 3600000003 HC OR TIME - INITIAL BASE CHARGE - PROCEDURE LEVEL THREE: Performed by: SURGERY

## 2024-12-26 PROCEDURE — 7100000001 HC RECOVERY ROOM TIME - INITIAL BASE CHARGE: Performed by: SURGERY

## 2024-12-26 PROCEDURE — 7100000010 HC PHASE TWO TIME - EACH INCREMENTAL 1 MINUTE: Performed by: SURGERY

## 2024-12-26 PROCEDURE — 2500000004 HC RX 250 GENERAL PHARMACY W/ HCPCS (ALT 636 FOR OP/ED): Performed by: STUDENT IN AN ORGANIZED HEALTH CARE EDUCATION/TRAINING PROGRAM

## 2024-12-26 PROCEDURE — 7100000009 HC PHASE TWO TIME - INITIAL BASE CHARGE: Performed by: SURGERY

## 2024-12-26 PROCEDURE — 2500000004 HC RX 250 GENERAL PHARMACY W/ HCPCS (ALT 636 FOR OP/ED): Performed by: ANESTHESIOLOGIST ASSISTANT

## 2024-12-26 PROCEDURE — 3600000008 HC OR TIME - EACH INCREMENTAL 1 MINUTE - PROCEDURE LEVEL THREE: Performed by: SURGERY

## 2024-12-26 PROCEDURE — 2500000004 HC RX 250 GENERAL PHARMACY W/ HCPCS (ALT 636 FOR OP/ED): Performed by: SURGERY

## 2024-12-26 PROCEDURE — 7100000002 HC RECOVERY ROOM TIME - EACH INCREMENTAL 1 MINUTE: Performed by: SURGERY

## 2024-12-26 PROCEDURE — 3700000001 HC GENERAL ANESTHESIA TIME - INITIAL BASE CHARGE: Performed by: SURGERY

## 2024-12-26 PROCEDURE — 3700000002 HC GENERAL ANESTHESIA TIME - EACH INCREMENTAL 1 MINUTE: Performed by: SURGERY

## 2024-12-26 PROCEDURE — 49592 RPR AA HRN 1ST < 3 NCR/STRN: CPT | Performed by: SURGERY

## 2024-12-26 RX ORDER — IBUPROFEN 600 MG/1
600 TABLET ORAL EVERY 6 HOURS PRN
Qty: 20 TABLET | Refills: 0 | Status: SHIPPED | OUTPATIENT
Start: 2024-12-26

## 2024-12-26 RX ORDER — OXYCODONE HYDROCHLORIDE 5 MG/1
5 TABLET ORAL ONCE
Status: DISCONTINUED | OUTPATIENT
Start: 2024-12-26 | End: 2024-12-26 | Stop reason: HOSPADM

## 2024-12-26 RX ORDER — MIDAZOLAM HYDROCHLORIDE 1 MG/ML
INJECTION, SOLUTION INTRAMUSCULAR; INTRAVENOUS AS NEEDED
Status: DISCONTINUED | OUTPATIENT
Start: 2024-12-26 | End: 2024-12-26

## 2024-12-26 RX ORDER — BUPIVACAINE HYDROCHLORIDE 5 MG/ML
INJECTION, SOLUTION PERINEURAL AS NEEDED
Status: DISCONTINUED | OUTPATIENT
Start: 2024-12-26 | End: 2024-12-26 | Stop reason: HOSPADM

## 2024-12-26 RX ORDER — LIDOCAINE HYDROCHLORIDE 20 MG/ML
INJECTION, SOLUTION INFILTRATION; PERINEURAL AS NEEDED
Status: DISCONTINUED | OUTPATIENT
Start: 2024-12-26 | End: 2024-12-26

## 2024-12-26 RX ORDER — KETOROLAC TROMETHAMINE 30 MG/ML
30 INJECTION, SOLUTION INTRAMUSCULAR; INTRAVENOUS ONCE
Status: COMPLETED | OUTPATIENT
Start: 2024-12-26 | End: 2024-12-26

## 2024-12-26 RX ORDER — CEFAZOLIN 1 G/1
INJECTION, POWDER, FOR SOLUTION INTRAVENOUS AS NEEDED
Status: DISCONTINUED | OUTPATIENT
Start: 2024-12-26 | End: 2024-12-26

## 2024-12-26 RX ORDER — CEFAZOLIN SODIUM 2 G/50ML
2 SOLUTION INTRAVENOUS ONCE
Status: DISCONTINUED | OUTPATIENT
Start: 2024-12-26 | End: 2024-12-26 | Stop reason: HOSPADM

## 2024-12-26 RX ORDER — POLYETHYLENE GLYCOL 3350 17 G/17G
17 POWDER, FOR SOLUTION ORAL DAILY PRN
Qty: 10 PACKET | Refills: 0 | Status: SHIPPED | OUTPATIENT
Start: 2024-12-26

## 2024-12-26 RX ORDER — OXYCODONE HYDROCHLORIDE 5 MG/1
5 TABLET ORAL EVERY 6 HOURS PRN
Qty: 12 TABLET | Refills: 0 | Status: SHIPPED | OUTPATIENT
Start: 2024-12-26

## 2024-12-26 RX ORDER — PROPOFOL 10 MG/ML
INJECTION, EMULSION INTRAVENOUS AS NEEDED
Status: DISCONTINUED | OUTPATIENT
Start: 2024-12-26 | End: 2024-12-26

## 2024-12-26 RX ORDER — FENTANYL CITRATE 50 UG/ML
INJECTION, SOLUTION INTRAMUSCULAR; INTRAVENOUS AS NEEDED
Status: DISCONTINUED | OUTPATIENT
Start: 2024-12-26 | End: 2024-12-26

## 2024-12-26 RX ORDER — SODIUM CHLORIDE 0.9 G/100ML
IRRIGANT IRRIGATION AS NEEDED
Status: DISCONTINUED | OUTPATIENT
Start: 2024-12-26 | End: 2024-12-26 | Stop reason: HOSPADM

## 2024-12-26 RX ADMIN — KETOROLAC TROMETHAMINE 30 MG: 30 INJECTION, SOLUTION INTRAMUSCULAR; INTRAVENOUS at 09:09

## 2024-12-26 ASSESSMENT — PAIN SCALES - GENERAL
PAINLEVEL_OUTOF10: 5 - MODERATE PAIN
PAINLEVEL_OUTOF10: 6
PAINLEVEL_OUTOF10: 4
PAINLEVEL_OUTOF10: 0 - NO PAIN

## 2024-12-26 ASSESSMENT — PAIN - FUNCTIONAL ASSESSMENT
PAIN_FUNCTIONAL_ASSESSMENT: 0-10
PAIN_FUNCTIONAL_ASSESSMENT: 0-10

## 2024-12-26 ASSESSMENT — COLUMBIA-SUICIDE SEVERITY RATING SCALE - C-SSRS
1. IN THE PAST MONTH, HAVE YOU WISHED YOU WERE DEAD OR WISHED YOU COULD GO TO SLEEP AND NOT WAKE UP?: NO
6. HAVE YOU EVER DONE ANYTHING, STARTED TO DO ANYTHING, OR PREPARED TO DO ANYTHING TO END YOUR LIFE?: NO
2. HAVE YOU ACTUALLY HAD ANY THOUGHTS OF KILLING YOURSELF?: NO

## 2024-12-26 ASSESSMENT — PAIN DESCRIPTION - LOCATION: LOCATION: ABDOMEN

## 2024-12-26 NOTE — OP NOTE
"Repair Ventral Hernia Operative Note     Date: 2024  OR Location: [facility] [place] [facility] [place]    Name: Kassi Edmonds \"Unadilla\", : 1990, Age: 34 y.o., MRN: [id number], Sex: female    Diagnosis  Pre-op Diagnosis      * Epigastric hernia [K43.9] Post-op Diagnosis     * Epigastric hernia [K43.9]     Procedures  Repair Ventral Hernia  [address] - [place] RPR AA HERNIA 1ST < 3 CM [id number]/STRANGULATED      Surgeons      * Catracho Choi - Primary    Resident/Fellow/Other Assistant:  Surgeons and Role:  * No surgeons found with a matching role *    Staff:   Circulator: Cornelia Flannery Person: Ana Cristina    Anesthesia Staff: Anesthesiologist: Cheri To MD  C-AA: MIHIR Rios    Procedure Summary  Anesthesia: General  ASA: II  Estimated Blood Loss: 3mL  Intra-op Medications:   Administrations occurring from 0730 to 0830 on 24:   Medication Name Total Dose   BUPivacaine HCl (Marcaine) 0.5 % (5 mg/mL) injection 10 mL   sodium chloride 0.9 % irrigation solution 250 mL   ceFAZolin (Ancef) vial 1 g 2 g   fentaNYL PF 0.05 mg/mL 50 mcg   lidocaine (Xylocaine) injection 2 % 60 mg   midazolam (Versed) 1 mg/1 mL 2 mg   propofol (Diprivan) injection 10 mg/mL 200 mg              Anesthesia Record               Intraprocedure I/O Totals       None           Specimen: No specimens collected              Drains and/or Catheters: * None in log *    Tourniquet Times:         Implants:     Findings: Less than 1 cm epigastric hernia containing fat    Indications: Kassi Edmonds \"Unadilla\" is an 34 y.o. female who is having surgery for Epigastric hernia [K43.9].     The patient was seen in the preoperative area. The risks, benefits, complications, treatment options, non-operative alternatives, expected recovery and outcomes were discussed with the patient. The possibilities of reaction to medication, pulmonary aspiration, injury to surrounding structures, bleeding, recurrent infection, the need for additional procedures, failure to diagnose a " condition, and creating a complication requiring transfusion or operation were discussed with the patient. The patient concurred with the proposed plan, giving informed consent.  The site of surgery was properly noted/marked if necessary per policy. The patient has been actively warmed in preoperative area. Preoperative antibiotics have been ordered and given within 1 hours of incision. Venous thrombosis prophylaxis have been ordered including bilateral sequential compression devices    Procedure Details: Patient was brought to the OR placed supine.  Timeout was performed confirm patient procedure.  Antibiotics were given general anesthesia administered through an LMA tube.  We prepped and draped the abdominal wall sterilely.  The area of the previously identified hernia we injected local and sharp incision the skin with scalpel.  Dissected down through subcutaneous tissues to the abdominal wall.  Because the hernia had reduced it was a little tricky to ultimately find it but I was able to identify narrow necked hernia sac with a clear fascial defect in the epigastrium.  The sac was excised.  The defect itself was less than a centimeter.  He was quickly closed with a figure-of-eight 0 Prolene suture.  No other obvious defects were seen.  We irrigated the wound and injected more local in the abdominal wall.  I closed the subcutaneous layer with a series of interrupted 3-0 Vicryl sutures.  The skin was closed with a running 4-0 Monocryl.  Dermabond was applied and the patient recovered in satisfactory condition.    Complications:  None; patient tolerated the procedure well.    Disposition: PACU - hemodynamically stable.  Condition: stable                 Additional Details:     Attending Attestation: I was present for the entire procedure.    Catracho Choi  Phone Number: [phone]

## 2024-12-26 NOTE — ANESTHESIA PREPROCEDURE EVALUATION
"Patient: Kassi Edmonds \"Torrey\"    Procedure Information       Anesthesia Start Date/Time: 12/26/24 0733    Procedure: Repair Ventral Hernia (Abdomen)    [place] [facility] [place] [facility] [place] [facility] [place] [facility] [place]    Surgeons: Catracho Choi MD            Relevant Problems   Digestive   (+) Epigastric hernia      Social   (+) Gender dysphoria in adult      Mental Health   (+) Attention deficit disorder       Clinical information reviewed:   Tobacco  Allergies  Meds   Med Hx  Surg Hx  OB Status  Fam Hx  Soc   Hx        NPO Detail:  NPO/Void Status  Carbohydrate Drink Given Prior to Surgery? : N  Date of Last Liquid: 12/25/24  Time of Last Liquid: 2030  Date of Last Solid: 12/25/24  Time of Last Solid: 2030  Last Intake Type: Light meal; Solid meal  Time of Last Void: 0545         Physical Exam    Airway  Mallampati: II  TM distance: >3 FB  Neck ROM: full     Cardiovascular - normal exam     Dental - normal exam     Pulmonary - normal exam     Abdominal - normal exam             Anesthesia Plan    History of general anesthesia?: yes  History of complications of general anesthesia?: no    ASA 2     general     intravenous induction   Postoperative administration of opioids is intended.  Trial extubation is planned.  Anesthetic plan and risks discussed with patient.  Use of blood products discussed with patient who consented to blood products.    Plan discussed with CRNA, CAA and attending.      "

## 2024-12-26 NOTE — ANESTHESIA POSTPROCEDURE EVALUATION
"Patient: Kassi Edmonds \"[place]\"    Procedure Summary       Date: 12/26/24 Room / Location: [facility] OR 02 / [facility]    Anesthesia Start: 0733 Anesthesia Stop: 0833    Procedure: Repair Ventral Hernia (Abdomen) Diagnosis:       Epigastric hernia      (Epigastric hernia [K43.9])    Surgeons: Catracho Choi MD Responsible Provider: Cheri To MD    Anesthesia Type: general ASA Status: 2            Anesthesia Type: general    Vitals Value Taken Time   /70 12/26/24 0845   Temp 37 °C (98.6 °F) 12/26/24 0830   Pulse 55 12/26/24 0845   Resp 16 12/26/24 0845   SpO2 100 % 12/26/24 0845       Anesthesia Post Evaluation    Patient location during evaluation: PACU  Patient participation: complete - patient participated  Level of consciousness: awake and alert  Pain management: adequate  Airway patency: patent  Cardiovascular status: acceptable  Respiratory status: acceptable  Hydration status: acceptable  Postoperative Nausea and Vomiting: none        There were no known notable events for this encounter.    "

## 2024-12-30 ENCOUNTER — APPOINTMENT (OUTPATIENT)
Dept: PHYSICAL THERAPY | Facility: CLINIC | Age: 34
End: 2024-12-30
Payer: COMMERCIAL

## 2024-12-30 DIAGNOSIS — M79.602 PAIN IN BOTH UPPER EXTREMITIES: ICD-10-CM

## 2024-12-30 DIAGNOSIS — M79.601 PAIN IN BOTH UPPER EXTREMITIES: ICD-10-CM

## 2024-12-30 DIAGNOSIS — G89.18 ACUTE POST-OPERATIVE PAIN: ICD-10-CM

## 2025-01-02 ENCOUNTER — APPOINTMENT (OUTPATIENT)
Dept: SURGERY | Facility: CLINIC | Age: 35
End: 2025-01-02
Payer: COMMERCIAL

## 2025-01-06 ENCOUNTER — TREATMENT (OUTPATIENT)
Dept: PHYSICAL THERAPY | Facility: CLINIC | Age: 35
End: 2025-01-06
Payer: COMMERCIAL

## 2025-01-06 DIAGNOSIS — M79.601 PAIN IN BOTH UPPER EXTREMITIES: ICD-10-CM

## 2025-01-06 DIAGNOSIS — M79.602 PAIN IN BOTH UPPER EXTREMITIES: ICD-10-CM

## 2025-01-06 DIAGNOSIS — G89.18 ACUTE POST-OPERATIVE PAIN: ICD-10-CM

## 2025-01-06 PROCEDURE — 97140 MANUAL THERAPY 1/> REGIONS: CPT | Mod: GP | Performed by: PHYSICAL THERAPIST

## 2025-01-06 PROCEDURE — 97110 THERAPEUTIC EXERCISES: CPT | Mod: GP | Performed by: PHYSICAL THERAPIST

## 2025-01-06 NOTE — PROGRESS NOTES
"Treatment note  Physical Therapy Treatment  Patient Name:Kassi Edmonds  MRN:[id number]  Today's Date:1/6/2025  Referred by: Antoinette Dudley  Time Calculation  Start Time: 0915  Stop Time: 0946  Time Calculation (min): 31 min    Therapy Diagnosis  1. Acute post-operative pain    2. Pain in both upper extremities         Insurance  Visit number:  2   Approved number of visits: MN/Unlimited  Onset Date: 10/10/2024  Precautions/Fall Risk: no lifting >15 lbs* Pacemaker no  Seizures No  Post Op Movement/Restrictions No    Subjective/Pain: Patient reports having hernia repair about 2 weeks ago and doing well. Told not to lift more than 15# and hasn't been doing much. Reports some continued stiffness on the L rib area but has been performing HEP with good results.   Current Pain Level (0-10): 1    Objective/Outcome Measures:  L UE Full ROM/ fascial tightness lats/QL    Treatment  Therapeutic Exercise 10 minutes  UE Ergo 3'/3'   Side Lying Trunk Rotation: 10\" x 10   Quad T/S Rotation: 5\"x 10   Educated on scap setting and re-introduction to PLOA exercises    Manual Therapy 21 minutes  IASTM to QL, paraspinals and lats and PROM to bilateral shoulders with scapular blocking    Neuromuscular Re-ed   minutes    Modalities   Vasopneumatic Device       minutes  Electrical Stimulation          minutes    Assessment: Patient presents with full AROM of both upper extremities, no scapular compensation felt during PROM. Manual therapy to QL and lats for fascial release. She has MD follow up for hernia repair. She continues to have mild symptomatic complaints regarding this but plans to return to work and progress back to PLOA with clearance from doctor. She demonstrates good understanding and education regarding activity modification and exercise prescription. All goals met.     Plan: Discharge to independent HEP and return to PLOA unless otherwise instructed by MD  F/U for hernia repair 1/8  Plan to return to work with clearance from [facility] " Doctor soon    Goals:  Upper Extremity Goals  At time of discharge, the patient will:  1) Perform HEP independently within the clinic to show adherence to initial program for symptom management.  2) Patient will report a reduction in pain at its worst from 4/10 to 0/10 to allow patient to perform essential ADLs and iADLs at [facility].  3) Demonstrate proper scapular position and posture for improved positional tolerances to sitting and standing > 60 mins/hrs  4) Increase ROM of bilateral UE to WNL in order to improve the ability to perform essential OH, self-care and ADLs  5) Decrease score of Quick Dash by > 8 points to meet MCID, demonstrating a clinically significant improvement in function.  6) Patient will tolerate laying on affected shoulder with unrestricted sleep for 6-8 hours.  7) Patient will demonstrate Apley reach to T6 bilaterally of affected shoulder to don/doff clothing without difficulty.  8) Patient will demonstrate functional strength of the affected shoulder to perform lift of 8-12 pounds for groceries and laundry duties.   9) Patient will demonstrate pain free strength of 5/5 for all affected shoulder groups for return to unrestricted lifting activities.  10) Be able to perform the ADL of returning to lifting unrestricted without an increase or production of symptoms for RTW duties.  Patient stated goal: restore full ROM of both UE

## 2025-01-08 ENCOUNTER — OFFICE VISIT (OUTPATIENT)
Dept: SURGERY | Facility: CLINIC | Age: 35
End: 2025-01-08
Payer: COMMERCIAL

## 2025-01-08 ENCOUNTER — APPOINTMENT (OUTPATIENT)
Dept: PRIMARY CARE | Facility: CLINIC | Age: 35
End: 2025-01-08
Payer: COMMERCIAL

## 2025-01-08 VITALS — SYSTOLIC BLOOD PRESSURE: 137 MMHG | DIASTOLIC BLOOD PRESSURE: 84 MMHG | TEMPERATURE: 97.2 F | HEART RATE: 67 BPM

## 2025-01-08 DIAGNOSIS — K43.9 EPIGASTRIC HERNIA: Primary | ICD-10-CM

## 2025-01-08 PROCEDURE — 99213 OFFICE O/P EST LOW 20 MIN: CPT | Performed by: SURGERY

## 2025-01-08 PROCEDURE — 1036F TOBACCO NON-USER: CPT | Performed by: SURGERY

## 2025-01-08 ASSESSMENT — PAIN SCALES - GENERAL: PAINLEVEL_OUTOF10: 0-NO PAIN

## 2025-01-08 ASSESSMENT — ENCOUNTER SYMPTOMS: DEPRESSION: 0

## 2025-01-08 NOTE — PROGRESS NOTES
Assessment/Plan   Status post repair of a small epigastric hernia primarily.  We reviewed timeline for when she can resume unrestricted activities.  Some paperwork was filled out for her employer.  Follow-up with me in 1 month or as needed    Subjective   Status post repair of a small epigastric hernia.  Doing well.  Minimal pain.  No discharge       Objective     Physical Exam  NAD  A&Ox3  Non icteric  CTA  RR  Abdomen soft min tender. Wounds clean, intact.  No recurrent hernia appreciated  Extremities warm, well perfused         Relevant Results      No results found for this or any previous visit (from the past 24 hours).        I spent 25 minutes in the professional and overall care of this patient.      Catracho Choi MD

## 2025-01-08 NOTE — PROGRESS NOTES
"History Of Present Illness  Kassi Edmonds \"[place]\" is a 34 y.o. female presenting with ***.     Past Medical History  Past Medical History:   Diagnosis Date    ADD (attention deficit disorder)     Delayed emergence from general anesthesia     Gender dysphoria in adult     Ventral hernia        Surgical History  Past Surgical History:   Procedure Laterality Date    OTHER SURGICAL HISTORY Bilateral 10/10/2024    Breast mammaplasty    SALPINGECTOMY Bilateral     TUBAL LIGATION          Social History  She reports that she has never smoked. She has never been exposed to tobacco smoke. She has never used smokeless tobacco. She reports current alcohol use. She reports that she does not currently use drugs after having used the following drugs: Marijuana.    Family History  No family history on file.     Allergies  Cat hair standardized allergenic extract    Review of Systems     Physical Exam     Last Recorded Vitals  Blood pressure 137/84, pulse 67, temperature 36.2 °C (97.2 °F), temperature source Temporal.    Relevant Results  {If you would like to pull in Medications, type .meds     If you would like to pull in Lab results for the last 24 hours, type .[id number]    If you would like to pull in Imaging results, type .imgrslt :99}      ***     Assessment/Plan   {Assess/PlanSmartLinks:36227}    ***       I spent *** minutes in the professional and overall care of this patient.      Catarcho Choi MD    "

## 2025-01-08 NOTE — LETTER
January 8, 2025     Patient: Kassi Edmonds   YOB: 1990   Date of Visit: 1/8/2025       To Whom It May Concern:    It is my medical opinion that Kassi Edmonds  who has been recovering from recent abdominal surgery can resume full duty without restrictions at work starting on January 23, 2025. .    If you have any questions or concerns, please don't hesitate to call.         Sincerely,        Catracho Choi MD    CC: No Recipients

## 2025-01-08 NOTE — LETTER
January 8, 2025     Ryanne Jordan APRN-CNP  [address]  [address]  [facility] [address]    Patient: Joshua Edmonds   YOB: 1990   Date of Visit: 1/8/2025       Dear Dr. Ryanne Jordan, MENDY-CNP:    Thank you for referring Joshua Edmonds to me for evaluation. Below are my notes for this consultation.  If you have questions, please do not hesitate to call me. I look forward to following your patient along with you.       Sincerely,     Catracho Choi MD      CC: No Recipients  ______________________________________________________________________________________    Assessment/Plan  Status post repair of a small epigastric hernia primarily.  We reviewed timeline for when she can resume unrestricted activities.  Some paperwork was filled out for her employer.  Follow-up with me in 1 month or as needed    Subjective  Status post repair of a small epigastric hernia.  Doing well.  Minimal pain.  No discharge       Objective    Physical Exam  NAD  A&Ox3  Non icteric  CTA  RR  Abdomen soft min tender. Wounds clean, intact.  No recurrent hernia appreciated  Extremities warm, well perfused         Relevant Results      No results found for this or any previous visit (from the past 24 hours).        I spent 25 minutes in the professional and overall care of this patient.      Catracho Choi MD

## 2025-01-09 ENCOUNTER — OFFICE VISIT (OUTPATIENT)
Dept: PRIMARY CARE | Facility: CLINIC | Age: 35
End: 2025-01-09
Payer: COMMERCIAL

## 2025-01-09 VITALS
OXYGEN SATURATION: 99 % | TEMPERATURE: 97.4 F | SYSTOLIC BLOOD PRESSURE: 110 MMHG | DIASTOLIC BLOOD PRESSURE: 68 MMHG | HEART RATE: 55 BPM | WEIGHT: 140 LBS | BODY MASS INDEX: 24.8 KG/M2

## 2025-01-09 DIAGNOSIS — F90.0 ATTENTION DEFICIT HYPERACTIVITY DISORDER (ADHD), PREDOMINANTLY INATTENTIVE TYPE: Primary | ICD-10-CM

## 2025-01-09 DIAGNOSIS — R00.2 PALPITATIONS: ICD-10-CM

## 2025-01-09 PROCEDURE — 80307 DRUG TEST PRSMV CHEM ANLYZR: CPT

## 2025-01-09 PROCEDURE — 99213 OFFICE O/P EST LOW 20 MIN: CPT | Performed by: NURSE PRACTITIONER

## 2025-01-09 PROCEDURE — 1036F TOBACCO NON-USER: CPT | Performed by: NURSE PRACTITIONER

## 2025-01-09 ASSESSMENT — PAIN SCALES - GENERAL: PAINLEVEL_OUTOF10: 0-NO PAIN

## 2025-01-09 NOTE — PATIENT INSTRUCTIONS
Continue adderall 5mg immediate release as needed  Watch for increase in palpitations with resuming once back to usual shifts  Please let me know when you need a refill  Follow up with any new or changing symptoms    CSA and UDS updated in office today  Follow up in 6 months for evaluation of therapy

## 2025-01-09 NOTE — PROGRESS NOTES
"Subjective   Patient ID: Kassi Edmonds \"[place]\" is a 34 y.o. female who presents for Med Management and ADHD    Presents for ADHD follow up  Has not taken adderall much since surgery  Tolerated switch to IR adderall  Palpitations happening every 6-8 weeks with 3-5 seconds of dizziness  Has decrease coffee to decaf on days taking adderall - noted fewer episodes with change to IR    Recently followed up with surgery after hernia repair  Healing well   Remains on light duty until 1/23      OARRS:  Ryanne Jordan, MENDY-CNP on 1/9/2025  2:45 PM  I have personally reviewed the OARRS report for Kassi Edmonds. I have considered the risks of abuse, dependence, addiction and diversion and I believe that it is clinically appropriate for Kassi Edmonds to be prescribed this medication    Is the patient prescribed a combination of a benzodiazepine and opioid?  No    Last Urine Drug Screen / ordered today: Yes  No results found for this or any previous visit (from the past 8760 hours).  Results are as expected.     Clinical rationale for not completing a Urine Drug Screen: to be completed today      Controlled Substance Agreement:  Date of the Last Agreement: 1/9/2024  Reviewed Controlled Substance Agreement including but not limited to the benefits, risks, and alternatives to treatment with a Controlled Substance medication(s).    Stimulants:   What is the patient's goal of therapy? ADHD management  Is this being achieved with current treatment? yes    Activities of Daily Living:   Is your overall impression that this patient is benefiting (symptom reduction outweighs side effects) from stimulant therapy? Yes     1. Physical Functioning: Better  2. Family Relationship: Better  3. Social Relationship: Better  4. Mood: Better  5. Sleep Patterns: Better  6. Overall Function: Better      Review of Systems    Objective     /68 (BP Location: Right arm, Patient Position: Sitting, BP Cuff Size: Adult)   Pulse 55   Temp 36.3 °C (97.4 " °F) (Temporal)   Wt 63.5 kg (140 lb)   SpO2 99%   BMI 24.80 kg/m²      Physical Exam  Vitals and nursing note reviewed.   Constitutional:       General: She is not in acute distress.     Appearance: Normal appearance.   Cardiovascular:      Rate and Rhythm: Regular rhythm. Bradycardia present.      Heart sounds: Normal heart sounds. No murmur heard.  Pulmonary:      Effort: Pulmonary effort is normal. No respiratory distress.      Breath sounds: Normal breath sounds.   Psychiatric:         Mood and Affect: Mood normal.         Behavior: Behavior normal.          Assessment/Plan   Diagnoses and all orders for this visit:  Attention deficit hyperactivity disorder (ADHD), predominantly inattentive type  -     Drug Screen, Urine With Reflex to Confirmation  -     Amphetamine Confirm, Urine  Palpitations      Patient Instructions   Continue adderall 5mg immediate release as needed  Watch for increase in palpitations with resuming once back to usual shifts  Please let me know when you need a refill  Follow up with any new or changing symptoms    CSA and UDS updated in office today  Follow up in 6 months for evaluation of therapy

## 2025-01-10 LAB
AMPHETAMINES UR QL SCN: NORMAL
BARBITURATES UR QL SCN: NORMAL
BENZODIAZ UR QL SCN: NORMAL
BZE UR QL SCN: NORMAL
CANNABINOIDS UR QL SCN: NORMAL
FENTANYL+NORFENTANYL UR QL SCN: NORMAL
METHADONE UR QL SCN: NORMAL
OPIATES UR QL SCN: NORMAL
OXYCODONE+OXYMORPHONE UR QL SCN: NORMAL
PCP UR QL SCN: NORMAL

## 2025-01-14 LAB
AMPHET UR-MCNC: <50 NG/ML
MDA UR-MCNC: <200 NG/ML
MDEA UR-MCNC: <200 NG/ML
MDMA UR-MCNC: <200 NG/ML
METHAMPHET UR-MCNC: <200 NG/ML
PHENTERMINE UR CFM-MCNC: <200 NG/ML

## 2025-02-28 NOTE — PROGRESS NOTES
PLASTIC SURGERY PRE-OP CLINIC NOTE    Subjective :  Patient ID: Kassi Edmonds  is a 35 y.o. female is here for pre-op appointment     Planned Date of Procedure: ***  Planned Surgical Procedure: ***    HPI:   Kassi Edmonds is a 35 y.o. female is here for pre-operative appointment for the above procedure(s).     Currently, the patient states there were no changes in their medications or medical history.     Patient's vitals are There were no vitals taken for this visit. today.     The patient does ***not have their post-operative compressive garments today.     ***Patient is currently on an ACE, ARB or Diuretic and has started to transition the medication dosing to the evening.     ***Patient is not currently on an ACE, ARB, or Diuretic.     ***Patient has Good Rx Card.     ***Patient is not on chronic NSAIDs. ***Patient is on chronic NSAIDS and will stop their current NSAIDs 1 week prior to surgery and start their prescribed Celebrex.       MEDICATIONS    Current Outpatient Medications:     amphetamine-dextroamphetamine (Adderall) 5 mg tablet, Take 1 tablet (5 mg) by mouth 2 times a day. (Patient taking differently: Take 1 tablet (5 mg) by mouth once daily.), Disp: 45 tablet, Rfl: 0    cetirizine (ZyrTEC) 5 mg tablet, Take 1 tablet (5 mg) by mouth once daily., Disp: , Rfl:      REVIEW OF SYSTEMS:    Constitutional: negative for fevers, chills, unintentional weight loss  HEENT: negative for changes in vision, headaches, changes in hearing, congestion, sore throat  Cardiovascular: negative for chest pain, palpitations  Respiratory: negative for cough, shortness of breath  Gastrointestinal: negative for nausea, vomiting, diarrhea  Genitourinary: negative for dysuria, hematuria  Musculoskeletal: negative for joint swelling or pain  Skin: negative for rashes or lesions  Psych: negative for depression, anxiety  Endocrine: negative for polyuria, polydipsia, cold/heat intolerance  Hem/Lymph: negative for bleeding  disorder    PHYSICAL EXAM  General: alert and oriented, no apparent distress  Psych: normal mood and affect, judgement intact.   Eyes: No conjunctival erythema, extraocular movements intact, no scleral icterus, pupils equal and reactive to light  HENT: normocephalic, atraumatic, no rhinorrhea, no trauma to external meatus, no prior surgical scars  CV: hemodynamically stable  Resp: unlabored, no increased work of breathing, equal bilateral chest rise, no cough or stridor  Abdomen: soft, non-tender, non-distended. No surgical scars present. No rashes noted. No rectus diastasis present ***  Neuro: ***Unremarkable without focal findings, AAOx3, CN 2-12 grossly intact  Extremities/Musculoskeletal: Upper and lower extremities are atraumatic in appearance without tenderness or deformity. No swelling or erythema. Full range of motion is noted to all joints. Steady gait noted.    Skin: ***Intact, soft and warm; no rashes, lesions, or bruising. No large masses or keloids noted on exam.     Focused exam of the breasts:   - Right Breast: Grade *** Ptosis. NAC with intact sensation. No nipple retraction or drainage noted. No palpable masses.    - Left Breast: Grade *** Ptosis. NAC with intact sensation. No nipple retraction or drainage noted. No palpable masses. or drainage noted. No palpable masses.       LABORATORY  Nutrition  Lab Results   Component Value Date    ALBUMIN 4.6 03/26/2024          ASSESSMENT/PLAN  Kassi Edmonds is a 35 y.o. female with hx of ***     The patient will be undergoing *** on *** at [facility] [place]    Informed consent discussed with the patient, including: condition, proposed care, treatments and services, alternative forms of treatment, and risks of no treatment.  Details discussed around the procedures to be used, and the risks and hazards involved, potential benefits, and side effects of the patient's proposed care, treatment, and services; the likelihood of the patient achieving his or her goals;  and any potential problems that might occur during recuperation. Reasonable alternative also discussed with the patient's proposed care, treatment, and services. The discussion encompasses risks, benefits, and side effects related to the alternative and risks related to not receiving the proposed care, treatment, and services. Written informed consent was obtained.    The patient was counseled to avoid anticoagulation medications and herbals including - but not limited to - ASA, NSAIDs, Plavix, fish oils, and green tea    Patient was counseled to avoid nicotine and areas with high amounts of secondhand smoke.     Patient was counseled to increase protein intake after surgery to aid with wound healing with goal of 120g/day.     Patient was counseled on need for compression garments and/or support bras, given information about where to acquire these garments, and instructions to bring with on the day of surgery.     We discussed postoperative follow-up visits, recuperation and return to work.    Advised patient to contact office with any questions or concerns    All findings and diagnosis was discussed with patient at the time of this visit. Patient states they understand and are in agreement with the treatment plan at the time of this visit.     Photos completed at this visit. ***    Medications eRx'd:   - ***Valium 5mg PO x 1 to be taken morning of surgery (ie while driving over) for white coat HTN.    - ***Propanolol 10mg PO x 1 to be taken morning of surgery (ie while driving over) for severe white coat HTN.   -Celebrex 200 mg BID with meals- Good Rx card provided to the patient.  -Gabapentin ***600 mg Q8H (***possible need to adjust dose for ESRD, age, prior use)  -Tylenol 1000 mg Q8H  -BactrimDS BID x 7 days *** if sulfa allergic, Clindamycin 300 mg QID x 7 days or Doxycycline 100 mg BID x 7 days or Minocycline 100 mg   -Hibiclens - instructed the patient to wash breast and/or abdomen and margins the night  before surgery or the morning of surgery; avoid washing face/eyes (2 packets if breast only, and 5 packets if breast and abdomen)  -Flexeril 10 mg Q8H x 1 week, then QHS for 1 week  -DVT Prophylaxis For Body Contouring:   - Low Risk: Lovenox (enoxaparin) 40 mg SQ daily (**if BMI > 40, then 30 mg BID) x 10 days  - High Risk: Lovenox (enoxaparin) 40 mg SQ daily (**if BMI > 40, then 30 mg BID) x 8 days, then either Lovenox or Oral Anticoagulation for *** days    -Xarelto (rivaroxaban) 10 mg PO daily  -Eliquis (apixaban) 2.5 mg PO BID (can write for 5 mg, take ½ tab BID to save $)   -Heparin 5000 units SQ BID (renal failure patients)    Preop holding meds ordered/verified for day of surgery:  Gabapentin 600 mg   Scopolamine patch     RTC *** after surgery

## 2025-03-03 RX ORDER — SULFAMETHOXAZOLE AND TRIMETHOPRIM 800; 160 MG/1; MG/1
1 TABLET ORAL 2 TIMES DAILY
Qty: 14 TABLET | Refills: 0 | Status: CANCELLED | OUTPATIENT
Start: 2025-03-03 | End: 2025-03-10

## 2025-03-04 ENCOUNTER — APPOINTMENT (OUTPATIENT)
Dept: PLASTIC SURGERY | Facility: CLINIC | Age: 35
End: 2025-03-04
Payer: COMMERCIAL

## 2025-03-04 VITALS
HEIGHT: 63 IN | SYSTOLIC BLOOD PRESSURE: 124 MMHG | WEIGHT: 143 LBS | DIASTOLIC BLOOD PRESSURE: 61 MMHG | BODY MASS INDEX: 25.34 KG/M2 | HEART RATE: 66 BPM

## 2025-03-04 DIAGNOSIS — F64.0 GENDER DYSPHORIA IN ADULT: Primary | ICD-10-CM

## 2025-03-04 DIAGNOSIS — F64.0 GENDER DYSPHORIA IN ADULT: ICD-10-CM

## 2025-03-04 PROCEDURE — 99213 OFFICE O/P EST LOW 20 MIN: CPT | Performed by: SURGERY

## 2025-03-04 PROCEDURE — 3008F BODY MASS INDEX DOCD: CPT | Performed by: SURGERY

## 2025-03-04 RX ORDER — ACETAMINOPHEN 325 MG/1
975 TABLET ORAL ONCE
OUTPATIENT
Start: 2025-03-04 | End: 2025-03-04

## 2025-03-04 RX ORDER — APREPITANT 40 MG/1
40 CAPSULE ORAL ONCE
OUTPATIENT
Start: 2025-03-04 | End: 2025-03-04

## 2025-03-04 RX ORDER — CEFAZOLIN SODIUM 2 G/100ML
2 INJECTION, SOLUTION INTRAVENOUS ONCE
OUTPATIENT
Start: 2025-03-04 | End: 2025-03-04

## 2025-03-04 RX ORDER — GABAPENTIN 300 MG/1
600 CAPSULE ORAL ONCE
OUTPATIENT
Start: 2025-03-04 | End: 2025-03-04

## 2025-03-04 NOTE — PROGRESS NOTES
PLASTIC SURGERY PRE-OP CLINIC NOTE  Date: 3/4/25  Subjective :  Patient ID: Kassi Edmonds  is a 35 y.o. female is here for pre-op appointment     Planned Date of Procedure: 3/17/25  Planned Surgical Procedure: nipple reconstruction    HPI:   Kassi Edmonds is a 35 y.o. female is here for pre-operative appointment for the above procedure(s).     [place] would like to be approximately one quarter of her current projection     Patient's vitals are Visit Vitals  /61   Pulse 66    today.     Patient has Good Rx Card.     Patient is not on chronic NSAIDs.       MEDICATIONS    Current Outpatient Medications:     amphetamine-dextroamphetamine (Adderall) 5 mg tablet, Take 1 tablet (5 mg) by mouth 2 times a day. (Patient taking differently: Take 1 tablet (5 mg) by mouth once daily.), Disp: 45 tablet, Rfl: 0    cetirizine (ZyrTEC) 5 mg tablet, Take 1 tablet (5 mg) by mouth once daily., Disp: , Rfl:      REVIEW OF SYSTEMS:    Constitutional: negative for fevers, chills, unintentional weight loss  HEENT: negative for changes in vision, headaches, changes in hearing, congestion, sore throat  Cardiovascular: negative for chest pain, palpitations  Respiratory: negative for cough, shortness of breath  Gastrointestinal: negative for nausea, vomiting, diarrhea  Genitourinary: negative for dysuria, hematuria  Musculoskeletal: negative for joint swelling or pain  Skin: negative for rashes or lesions  Psych: negative for depression, anxiety  Endocrine: negative for polyuria, polydipsia, cold/heat intolerance  Hem/Lymph: negative for bleeding disorder    PHYSICAL EXAM  General: alert and oriented, no apparent distress  Psych: normal mood and affect, judgement intact.   Eyes: No conjunctival erythema, extraocular movements intact, no scleral icterus, pupils equal and reactive to light  HENT: normocephalic, atraumatic, no rhinorrhea, no trauma to external meatus, no prior surgical scars  CV: hemodynamically stable  Resp: unlabored, no  increased work of breathing, equal bilateral chest rise, no cough or stridor  Abdomen: soft, non-tender, non-distended. No surgical scars present. No rashes noted. No rectus diastasis present   Neuro: Unremarkable without focal findings, AAOx3, CN 2-12 grossly intact  Extremities/Musculoskeletal: Upper and lower extremities are atraumatic in appearance without tenderness or deformity. No swelling or erythema. Full range of motion is noted to all joints. Steady gait noted.    Skin: Intact, soft and warm; no rashes, lesions, or bruising. No large masses or keloids noted on exam.     Focused exam of the breasts:      LABORATORY  Nutrition  Lab Results   Component Value Date    ALBUMIN 4.6 03/26/2024          ASSESSMENT/PLAN  [place] 34 y.o. gender binary (cis female) with history of bilateral gynecomastia surgical excision on 9/23/24.     The patient will be undergoing a nipple revision on 3/10/25 at [facility]. Would like to be a quarter of her current size.     Informed consent discussed with the patient, including: condition, proposed care, treatments and services, alternative forms of treatment, and risks of no treatment.  Details discussed around the procedures to be used, and the risks and hazards involved, potential benefits, and side effects of the patient's proposed care, treatment, and services; the likelihood of the patient achieving his or her goals; and any potential problems that might occur during recuperation. Reasonable alternative also discussed with the patient's proposed care, treatment, and services. The discussion encompasses risks, benefits, and side effects related to the alternative and risks related to not receiving the proposed care, treatment, and services. Written informed consent was obtained.    The patient was counseled to avoid anticoagulation medications and herbals including - but not limited to - ASA, NSAIDs, Plavix, fish oils, and green tea    Patient was counseled to avoid nicotine and  areas with high amounts of secondhand smoke.     Patient was counseled to increase protein intake after surgery to aid with wound healing with goal of 120g/day.     Patient was counseled on need for compression garments and/or support bras, given information about where to acquire these garments, and instructions to bring with on the day of surgery.     We discussed postoperative follow-up visits, recuperation and return to work.    Advised patient to contact office with any questions or concerns    All findings and diagnosis was discussed with patient at the time of this visit. Patient states they understand and are in agreement with the treatment plan at the time of this visit.     Photos completed at this visit.     Medications eRx'd:  -Celebrex 200 mg BID with meals- Good Rx card provided to the patient.  -Gabapentin 300 mg Q8H   -Tylenol 1000 mg Q8H  -Hibiclens - instructed the patient to wash breast and/or abdomen and margins the night before surgery or the morning of surgery; avoid washing face/eyes (2 packets if breast only, and 5 packets if breast and abdomen)    RTC 3/25 after surgery     Scribe Attestation  By signing my name below, LAURA Kimmy Hernandez, [place], attest that this documentation has been prepared under the direction and in the presence of Antoinette Dudley MD. Verbal consent obtained from the patient.      Attending Attestation:  Antoinette SANCHEZ MD, personal performed the history, exam, and decision making on this patient.  A total of 20 mins were spent in patient counseling, review of medical records, and coordination of care.

## 2025-03-04 NOTE — H&P (VIEW-ONLY)
PLASTIC SURGERY PRE-OP CLINIC NOTE  Date: 3/4/25  Subjective :  Patient ID: Kassi Edmonds  is a 35 y.o. female is here for pre-op appointment     Planned Date of Procedure: 3/17/25  Planned Surgical Procedure: nipple reconstruction    HPI:   Kassi Edmonds is a 35 y.o. female is here for pre-operative appointment for the above procedure(s).     [place] would like to be approximately one quarter of her current projection     Patient's vitals are Visit Vitals  /61   Pulse 66    today.     Patient has Good Rx Card.     Patient is not on chronic NSAIDs.       MEDICATIONS    Current Outpatient Medications:     amphetamine-dextroamphetamine (Adderall) 5 mg tablet, Take 1 tablet (5 mg) by mouth 2 times a day. (Patient taking differently: Take 1 tablet (5 mg) by mouth once daily.), Disp: 45 tablet, Rfl: 0    cetirizine (ZyrTEC) 5 mg tablet, Take 1 tablet (5 mg) by mouth once daily., Disp: , Rfl:      REVIEW OF SYSTEMS:    Constitutional: negative for fevers, chills, unintentional weight loss  HEENT: negative for changes in vision, headaches, changes in hearing, congestion, sore throat  Cardiovascular: negative for chest pain, palpitations  Respiratory: negative for cough, shortness of breath  Gastrointestinal: negative for nausea, vomiting, diarrhea  Genitourinary: negative for dysuria, hematuria  Musculoskeletal: negative for joint swelling or pain  Skin: negative for rashes or lesions  Psych: negative for depression, anxiety  Endocrine: negative for polyuria, polydipsia, cold/heat intolerance  Hem/Lymph: negative for bleeding disorder    PHYSICAL EXAM  General: alert and oriented, no apparent distress  Psych: normal mood and affect, judgement intact.   Eyes: No conjunctival erythema, extraocular movements intact, no scleral icterus, pupils equal and reactive to light  HENT: normocephalic, atraumatic, no rhinorrhea, no trauma to external meatus, no prior surgical scars  CV: hemodynamically stable  Resp: unlabored, no  increased work of breathing, equal bilateral chest rise, no cough or stridor  Abdomen: soft, non-tender, non-distended. No surgical scars present. No rashes noted. No rectus diastasis present   Neuro: Unremarkable without focal findings, AAOx3, CN 2-12 grossly intact  Extremities/Musculoskeletal: Upper and lower extremities are atraumatic in appearance without tenderness or deformity. No swelling or erythema. Full range of motion is noted to all joints. Steady gait noted.    Skin: Intact, soft and warm; no rashes, lesions, or bruising. No large masses or keloids noted on exam.     Focused exam of the breasts:      LABORATORY  Nutrition  Lab Results   Component Value Date    ALBUMIN 4.6 03/26/2024          ASSESSMENT/PLAN  [place] Grey 34 y.o. gender binary (cis female) with history of bilateral gynecomastia surgical excision on 9/23/24.     The patient will be undergoing a nipple revision on 3/10/25 at [facility]. Would like to be a quarter of her current size.     Informed consent discussed with the patient, including: condition, proposed care, treatments and services, alternative forms of treatment, and risks of no treatment.  Details discussed around the procedures to be used, and the risks and hazards involved, potential benefits, and side effects of the patient's proposed care, treatment, and services; the likelihood of the patient achieving his or her goals; and any potential problems that might occur during recuperation. Reasonable alternative also discussed with the patient's proposed care, treatment, and services. The discussion encompasses risks, benefits, and side effects related to the alternative and risks related to not receiving the proposed care, treatment, and services. Written informed consent was obtained.    The patient was counseled to avoid anticoagulation medications and herbals including - but not limited to - ASA, NSAIDs, Plavix, fish oils, and green tea    Patient was counseled to avoid nicotine and  areas with high amounts of secondhand smoke.     Patient was counseled to increase protein intake after surgery to aid with wound healing with goal of 120g/day.     Patient was counseled on need for compression garments and/or support bras, given information about where to acquire these garments, and instructions to bring with on the day of surgery.     We discussed postoperative follow-up visits, recuperation and return to work.    Advised patient to contact office with any questions or concerns    All findings and diagnosis was discussed with patient at the time of this visit. Patient states they understand and are in agreement with the treatment plan at the time of this visit.     Photos completed at this visit.     Medications eRx'd:  -Celebrex 200 mg BID with meals- Good Rx card provided to the patient.  -Gabapentin 300 mg Q8H   -Tylenol 1000 mg Q8H  -Hibiclens - instructed the patient to wash breast and/or abdomen and margins the night before surgery or the morning of surgery; avoid washing face/eyes (2 packets if breast only, and 5 packets if breast and abdomen)    RTC 3/25 after surgery     Scribe Attestation  By signing my name below, LAURA Kimmy Hernandez, [place], attest that this documentation has been prepared under the direction and in the presence of Antoinette Dudley MD. Verbal consent obtained from the patient.      Attending Attestation:  Antoinette SANCHEZ MD, personal performed the history, exam, and decision making on this patient.  A total of 20 mins were spent in patient counseling, review of medical records, and coordination of care.

## 2025-03-10 RX ORDER — CHLORHEXIDINE GLUCONATE 40 MG/ML
SOLUTION TOPICAL ONCE
Qty: 118 ML | Refills: 0 | Status: SHIPPED | OUTPATIENT
Start: 2025-03-10 | End: 2025-03-10

## 2025-03-10 RX ORDER — ACETAMINOPHEN 500 MG
1000 TABLET ORAL EVERY 8 HOURS
Qty: 84 TABLET | Refills: 0 | Status: SHIPPED | OUTPATIENT
Start: 2025-03-10 | End: 2025-03-24

## 2025-03-10 RX ORDER — GABAPENTIN 300 MG/1
300 CAPSULE ORAL EVERY 8 HOURS
Qty: 42 CAPSULE | Refills: 0 | Status: SHIPPED | OUTPATIENT
Start: 2025-03-10 | End: 2025-03-24

## 2025-03-10 RX ORDER — CELECOXIB 200 MG/1
200 CAPSULE ORAL 2 TIMES DAILY
Qty: 28 CAPSULE | Refills: 0 | Status: SHIPPED | OUTPATIENT
Start: 2025-03-10 | End: 2025-03-24

## 2025-03-12 NOTE — PROGRESS NOTES
PLASTIC SURGERY CLINIC VISIT  POSTOP BREAST RECONSTRUCTION     Date: 3/25/25  Date of Surgery: 3/17/25  Surgical Procedure: bilateral nipple reconstruction         HPI:   Kassi Edmonds 35 y.o. female is here for post-operative appointment for the above procedure(s).      Interval changes as of this date:   3/25/25: Doing well postoperatively. Pain well controlled. Mepilex dressings intact.     MEDICATIONS    Current Outpatient Medications:     acetaminophen (Tylenol Extra Strength) 500 mg tablet, Take 2 tablets (1,000 mg) by mouth every 8 hours for 14 days., Disp: 84 tablet, Rfl: 0    amphetamine-dextroamphetamine (Adderall) 5 mg tablet, Take 1 tablet (5 mg) by mouth 2 times a day. (Patient taking differently: Take 1 tablet (5 mg) by mouth once daily.), Disp: 45 tablet, Rfl: 0    celecoxib (CeleBREX) 200 mg capsule, Take 1 capsule (200 mg) by mouth 2 times a day for 14 days., Disp: 28 capsule, Rfl: 0    cetirizine (ZyrTEC) 5 mg tablet, Take 1 tablet (5 mg) by mouth once daily., Disp: , Rfl:     gabapentin (Neurontin) 300 mg capsule, Take 1 capsule (300 mg) by mouth every 8 hours for 14 days., Disp: 42 capsule, Rfl: 0      OBJECTIVE [x]Expand by Default  There were no vitals taken for this visit.     REVIEW OF SYSTEMS:    Constitutional: negative for fevers, chills, unintentional weight loss  HEENT: negative for changes in vision, headaches, changes in hearing, congestion, sore throat  Cardiovascular: negative for chest pain, palpitations  Respiratory: negative for cough, shortness of breath  Gastrointestinal: negative for nausea, vomiting, diarrhea  Genitourinary: negative for dysuria, hematuria  Musculoskeletal: negative for joint swelling or pain  Skin: negative for rashes or lesions  Psych: negative for depression, anxiety  Endocrine: negative for polyuria, polydipsia, cold/heat intolerance  Hem/Lymph: negative for bleeding disorder     PHYSICAL EXAM  General: alert and oriented, no apparent  distress    Focused exam of the breasts:  Right: Nipple viable, incisions C/D/I, healing well.   Left: Nipple viable, incisions C/D/I. Dried blood cleaned, wound still in process of healing.      ASSESSMENT/PLAN  Kassi Edmonds 35 y.o. female who had bilateral nipple reconstruction  on 3/17/25 who presents for POV.    Doing well post operatively, pain well controlled. Following activity restrictions.    Nipples viable and healing well.     Plan/Recommendations:  1. Continue increased protein intake  2. Continue activity restrictions  3.         Apply mupirocin ointment and mepilex dressing every 1-2 days  4.         OK to shower     RTC in 1 week for wound check and possible clearance to return to work.     Kandice Garay PA-C

## 2025-03-14 ENCOUNTER — ANESTHESIA EVENT (OUTPATIENT)
Dept: OPERATING ROOM | Facility: HOSPITAL | Age: 35
End: 2025-03-14
Payer: COMMERCIAL

## 2025-03-14 NOTE — ANESTHESIA PREPROCEDURE EVALUATION
"Patient: Kassi Edmonds \"[place]\"    Procedure Information       Date/Time: 03/17/25 1030    Procedure: Bilateral Nipple Reconstruction (Bilateral: Breast)    Location: [facility] OR 04 / Virtual [facility] OR    Surgeons: Antoinette Dudley MD            Relevant Problems   No relevant active problems       Clinical information reviewed:   Tobacco  Allergies  Meds   Med Hx  Surg Hx  OB Status  Fam Hx  Soc   Hx         Past Medical History:   Diagnosis Date    ADD (attention deficit disorder)     Delayed emergence from general anesthesia     Gender dysphoria in adult     Ventral hernia       Past Surgical History:   Procedure Laterality Date    BREAST SURGERY Bilateral 10/10/2024    Reduction, mammaplasty    SALPINGECTOMY Bilateral     TUBAL LIGATION  10/18/2023    VENTRAL HERNIA REPAIR  12/26/2024     Social History     Tobacco Use    Smoking status: Never     Passive exposure: Never    Smokeless tobacco: Never    Tobacco comments:     Pt has hoarseness, states allergies, no fever, cough or congestion     Denies any personal/family reaction or problems with anesthesia     Denies SOB with stairs/daily activity     Ambulates freely   Vaping Use    Vaping status: Never Used   Substance Use Topics    Alcohol use: Yes     Comment: rarely    Drug use: Not Currently     Types: Marijuana      Current Outpatient Medications   Medication Instructions    acetaminophen (TYLENOL EXTRA STRENGTH) 1,000 mg, oral, Every 8 hours    amphetamine-dextroamphetamine (Adderall) 5 mg tablet 5 mg, oral, 2 times daily    celecoxib (CELEBREX) 200 mg, oral, 2 times daily    cetirizine (ZYRTEC) 5 mg, Daily    chlorhexidine (Hibiclens) 4 % external liquid Topical, Once, Shower normally. Apply Hibiclens from clavicle to hip making sure to apply under the breast, axilla and belly button. Do not worry about the back. Do not wash off.    gabapentin (NEURONTIN) 300 mg, oral, Every 8 hours      Allergies   Allergen Reactions    Cat Hair Standardized Allergenic " "Extract Other        Chemistry    Lab Results   Component Value Date/Time     03/26/2024 0957    K 3.8 03/26/2024 0957     03/26/2024 0957    CO2 28 03/26/2024 0957    BUN 20 03/26/2024 0957    CREATININE 0.93 03/26/2024 0957    Lab Results   Component Value Date/Time    CALCIUM 10.1 03/26/2024 0957    ALKPHOS 37 03/26/2024 0957    AST 13 03/26/2024 0957    ALT 17 03/26/2024 0957    BILITOT 1.0 03/26/2024 0957          Lab Results   Component Value Date    HGBA1C 5.4 06/11/2024     Lab Results   Component Value Date/Time    WBC 5.0 03/26/2024 0957    HGB 14.1 03/26/2024 0957    HCT 43.1 03/26/2024 0957     03/26/2024 0957     No results found for: \"PROTIME\", \"PTT\", \"INR\"  No results found for: \"ABORH\"  No results found for this or any previous visit (from the past 4464 hours).  No results found for this or any previous visit from the past 1095 days.       Visit Vitals  /63   Pulse 71   Temp 36.4 °C (97.5 °F) (Temporal)   Resp 18   Ht 1.6 m (5' 3\")   Wt 63.4 kg (139 lb 12.4 oz)   SpO2 100%   BMI 24.76 kg/m²   OB Status Having periods   Smoking Status Never   BSA 1.68 m²     NPO/Void Status  Carbohydrate Drink Given Prior to Surgery? : Y  Date of Last Liquid: 03/16/25  Time of Last Liquid: 2100  Date of Last Solid: 03/16/25  Time of Last Solid: 2230  Last Intake Type: Clear fluids  Time of Last Void: 0920        Physical Exam    Airway  Mallampati: I  TM distance: >3 FB  Neck ROM: full     Cardiovascular - normal exam  Rhythm: regular  Rate: normal     Dental    Pulmonary - normal exam     Abdominal - normal exam             Anesthesia Plan    History of general anesthesia?: yes  History of complications of general anesthesia?: no    ASA 2     general   (General with LMA)  intravenous induction   Postoperative administration of opioids is intended.  Trial extubation is planned.  Anesthetic plan and risks discussed with patient.    Plan discussed with CRNA and CAA.        "

## 2025-03-17 ENCOUNTER — ANESTHESIA (OUTPATIENT)
Dept: OPERATING ROOM | Facility: HOSPITAL | Age: 35
End: 2025-03-17
Payer: COMMERCIAL

## 2025-03-17 ENCOUNTER — HOSPITAL ENCOUNTER (OUTPATIENT)
Facility: HOSPITAL | Age: 35
Setting detail: OUTPATIENT SURGERY
Discharge: HOME | End: 2025-03-17
Attending: SURGERY | Admitting: SURGERY
Payer: COMMERCIAL

## 2025-03-17 VITALS
SYSTOLIC BLOOD PRESSURE: 102 MMHG | HEART RATE: 60 BPM | RESPIRATION RATE: 14 BRPM | TEMPERATURE: 97.2 F | HEIGHT: 63 IN | DIASTOLIC BLOOD PRESSURE: 59 MMHG | OXYGEN SATURATION: 100 % | BODY MASS INDEX: 24.77 KG/M2 | WEIGHT: 139.77 LBS

## 2025-03-17 DIAGNOSIS — F64.0 GENDER DYSPHORIA IN ADULT: Primary | ICD-10-CM

## 2025-03-17 PROCEDURE — A19350 PR NIPPLE/AREOLA RECONSTRUCTION: Performed by: ANESTHESIOLOGIST ASSISTANT

## 2025-03-17 PROCEDURE — 3700000002 HC GENERAL ANESTHESIA TIME - EACH INCREMENTAL 1 MINUTE: Performed by: SURGERY

## 2025-03-17 PROCEDURE — A19350 PR NIPPLE/AREOLA RECONSTRUCTION: Performed by: ANESTHESIOLOGY

## 2025-03-17 PROCEDURE — 2500000004 HC RX 250 GENERAL PHARMACY W/ HCPCS (ALT 636 FOR OP/ED): Performed by: ANESTHESIOLOGIST ASSISTANT

## 2025-03-17 PROCEDURE — 2500000005 HC RX 250 GENERAL PHARMACY W/O HCPCS: Performed by: SURGERY

## 2025-03-17 PROCEDURE — 88360 TUMOR IMMUNOHISTOCHEM/MANUAL: CPT | Performed by: SPECIALIST

## 2025-03-17 PROCEDURE — 3600000007 HC OR TIME - EACH INCREMENTAL 1 MINUTE - PROCEDURE LEVEL TWO: Performed by: SURGERY

## 2025-03-17 PROCEDURE — 2720000007 HC OR 272 NO HCPCS: Performed by: SURGERY

## 2025-03-17 PROCEDURE — 88305 TISSUE EXAM BY PATHOLOGIST: CPT | Performed by: SPECIALIST

## 2025-03-17 PROCEDURE — 7100000001 HC RECOVERY ROOM TIME - INITIAL BASE CHARGE: Performed by: SURGERY

## 2025-03-17 PROCEDURE — 2500000002 HC RX 250 W HCPCS SELF ADMINISTERED DRUGS (ALT 637 FOR MEDICARE OP, ALT 636 FOR OP/ED): Performed by: SURGERY

## 2025-03-17 PROCEDURE — 2500000005 HC RX 250 GENERAL PHARMACY W/O HCPCS: Performed by: ANESTHESIOLOGY

## 2025-03-17 PROCEDURE — 2500000001 HC RX 250 WO HCPCS SELF ADMINISTERED DRUGS (ALT 637 FOR MEDICARE OP): Performed by: SURGERY

## 2025-03-17 PROCEDURE — 88305 TISSUE EXAM BY PATHOLOGIST: CPT | Mod: TC,59,AHULAB | Performed by: SURGERY

## 2025-03-17 PROCEDURE — 7100000009 HC PHASE TWO TIME - INITIAL BASE CHARGE: Performed by: SURGERY

## 2025-03-17 PROCEDURE — 88342 IMHCHEM/IMCYTCHM 1ST ANTB: CPT | Performed by: SPECIALIST

## 2025-03-17 PROCEDURE — 19350 NIPPLE/AREOLA RECONSTRUCTION: CPT | Performed by: SURGERY

## 2025-03-17 PROCEDURE — 2500000004 HC RX 250 GENERAL PHARMACY W/ HCPCS (ALT 636 FOR OP/ED): Performed by: ANESTHESIOLOGY

## 2025-03-17 PROCEDURE — 2500000001 HC RX 250 WO HCPCS SELF ADMINISTERED DRUGS (ALT 637 FOR MEDICARE OP): Performed by: ANESTHESIOLOGY

## 2025-03-17 PROCEDURE — 3700000001 HC GENERAL ANESTHESIA TIME - INITIAL BASE CHARGE: Performed by: SURGERY

## 2025-03-17 PROCEDURE — 3600000002 HC OR TIME - INITIAL BASE CHARGE - PROCEDURE LEVEL TWO: Performed by: SURGERY

## 2025-03-17 PROCEDURE — 7100000010 HC PHASE TWO TIME - EACH INCREMENTAL 1 MINUTE: Performed by: SURGERY

## 2025-03-17 PROCEDURE — 7100000002 HC RECOVERY ROOM TIME - EACH INCREMENTAL 1 MINUTE: Performed by: SURGERY

## 2025-03-17 RX ORDER — PROPOFOL 10 MG/ML
INJECTION, EMULSION INTRAVENOUS AS NEEDED
Status: DISCONTINUED | OUTPATIENT
Start: 2025-03-17 | End: 2025-03-17

## 2025-03-17 RX ORDER — APREPITANT 40 MG/1
40 CAPSULE ORAL ONCE
Status: COMPLETED | OUTPATIENT
Start: 2025-03-17 | End: 2025-03-17

## 2025-03-17 RX ORDER — DROPERIDOL 2.5 MG/ML
0.62 INJECTION, SOLUTION INTRAMUSCULAR; INTRAVENOUS ONCE AS NEEDED
Status: DISCONTINUED | OUTPATIENT
Start: 2025-03-17 | End: 2025-03-17 | Stop reason: HOSPADM

## 2025-03-17 RX ORDER — ACETAMINOPHEN 325 MG/1
975 TABLET ORAL ONCE
Status: COMPLETED | OUTPATIENT
Start: 2025-03-17 | End: 2025-03-17

## 2025-03-17 RX ORDER — OXYCODONE HYDROCHLORIDE 5 MG/1
5 TABLET ORAL EVERY 4 HOURS PRN
Status: DISCONTINUED | OUTPATIENT
Start: 2025-03-17 | End: 2025-03-17 | Stop reason: HOSPADM

## 2025-03-17 RX ORDER — PHENYLEPHRINE HCL IN 0.9% NACL 0.4MG/10ML
SYRINGE (ML) INTRAVENOUS AS NEEDED
Status: DISCONTINUED | OUTPATIENT
Start: 2025-03-17 | End: 2025-03-17

## 2025-03-17 RX ORDER — MIDAZOLAM HYDROCHLORIDE 1 MG/ML
INJECTION INTRAMUSCULAR; INTRAVENOUS AS NEEDED
Status: DISCONTINUED | OUTPATIENT
Start: 2025-03-17 | End: 2025-03-17

## 2025-03-17 RX ORDER — CEFAZOLIN SODIUM 2 G/100ML
2 INJECTION, SOLUTION INTRAVENOUS ONCE
Status: DISCONTINUED | OUTPATIENT
Start: 2025-03-17 | End: 2025-03-17 | Stop reason: HOSPADM

## 2025-03-17 RX ORDER — DIPHENHYDRAMINE HYDROCHLORIDE 50 MG/ML
25 INJECTION, SOLUTION INTRAMUSCULAR; INTRAVENOUS ONCE AS NEEDED
Status: DISCONTINUED | OUTPATIENT
Start: 2025-03-17 | End: 2025-03-17 | Stop reason: HOSPADM

## 2025-03-17 RX ORDER — SODIUM CHLORIDE, SODIUM LACTATE, POTASSIUM CHLORIDE, CALCIUM CHLORIDE 600; 310; 30; 20 MG/100ML; MG/100ML; MG/100ML; MG/100ML
INJECTION, SOLUTION INTRAVENOUS CONTINUOUS PRN
Status: DISCONTINUED | OUTPATIENT
Start: 2025-03-17 | End: 2025-03-17

## 2025-03-17 RX ORDER — GLYCOPYRROLATE 0.2 MG/ML
INJECTION INTRAMUSCULAR; INTRAVENOUS AS NEEDED
Status: DISCONTINUED | OUTPATIENT
Start: 2025-03-17 | End: 2025-03-17

## 2025-03-17 RX ORDER — LIDOCAINE HYDROCHLORIDE 20 MG/ML
INJECTION, SOLUTION EPIDURAL; INFILTRATION; INTRACAUDAL; PERINEURAL AS NEEDED
Status: DISCONTINUED | OUTPATIENT
Start: 2025-03-17 | End: 2025-03-17

## 2025-03-17 RX ORDER — ACETAMINOPHEN 325 MG/1
650 TABLET ORAL EVERY 4 HOURS PRN
Status: DISCONTINUED | OUTPATIENT
Start: 2025-03-17 | End: 2025-03-17 | Stop reason: HOSPADM

## 2025-03-17 RX ORDER — BUPIVACAINE HCL/EPINEPHRINE 0.5-1:200K
VIAL (ML) INJECTION AS NEEDED
Status: DISCONTINUED | OUTPATIENT
Start: 2025-03-17 | End: 2025-03-17 | Stop reason: HOSPADM

## 2025-03-17 RX ORDER — GABAPENTIN 300 MG/1
600 CAPSULE ORAL ONCE
Status: COMPLETED | OUTPATIENT
Start: 2025-03-17 | End: 2025-03-17

## 2025-03-17 RX ORDER — ALBUTEROL SULFATE 0.83 MG/ML
2.5 SOLUTION RESPIRATORY (INHALATION) ONCE AS NEEDED
Status: DISCONTINUED | OUTPATIENT
Start: 2025-03-17 | End: 2025-03-17 | Stop reason: HOSPADM

## 2025-03-17 RX ORDER — KETOROLAC TROMETHAMINE 30 MG/ML
INJECTION, SOLUTION INTRAMUSCULAR; INTRAVENOUS AS NEEDED
Status: DISCONTINUED | OUTPATIENT
Start: 2025-03-17 | End: 2025-03-17

## 2025-03-17 RX ORDER — ONDANSETRON HYDROCHLORIDE 2 MG/ML
INJECTION, SOLUTION INTRAVENOUS AS NEEDED
Status: DISCONTINUED | OUTPATIENT
Start: 2025-03-17 | End: 2025-03-17

## 2025-03-17 RX ORDER — ONDANSETRON HYDROCHLORIDE 2 MG/ML
4 INJECTION, SOLUTION INTRAVENOUS ONCE AS NEEDED
Status: COMPLETED | OUTPATIENT
Start: 2025-03-17 | End: 2025-03-17

## 2025-03-17 RX ORDER — FENTANYL CITRATE 50 UG/ML
INJECTION, SOLUTION INTRAMUSCULAR; INTRAVENOUS AS NEEDED
Status: DISCONTINUED | OUTPATIENT
Start: 2025-03-17 | End: 2025-03-17

## 2025-03-17 RX ORDER — CEFAZOLIN 1 G/1
INJECTION, POWDER, FOR SOLUTION INTRAVENOUS AS NEEDED
Status: DISCONTINUED | OUTPATIENT
Start: 2025-03-17 | End: 2025-03-17

## 2025-03-17 RX ADMIN — DEXAMETHASONE SODIUM PHOSPHATE 8 MG: 4 INJECTION, SOLUTION INTRAMUSCULAR; INTRAVENOUS at 13:33

## 2025-03-17 RX ADMIN — APREPITANT 40 MG: 40 CAPSULE ORAL at 09:41

## 2025-03-17 RX ADMIN — SODIUM CHLORIDE, POTASSIUM CHLORIDE, SODIUM LACTATE AND CALCIUM CHLORIDE: 600; 310; 30; 20 INJECTION, SOLUTION INTRAVENOUS at 13:18

## 2025-03-17 RX ADMIN — MIDAZOLAM HYDROCHLORIDE 4 MG: 1 INJECTION, SOLUTION INTRAMUSCULAR; INTRAVENOUS at 13:18

## 2025-03-17 RX ADMIN — KETOROLAC TROMETHAMINE 30 MG: 30 INJECTION, SOLUTION INTRAMUSCULAR at 13:55

## 2025-03-17 RX ADMIN — Medication 100 MCG: at 13:45

## 2025-03-17 RX ADMIN — GABAPENTIN 600 MG: 300 CAPSULE ORAL at 09:41

## 2025-03-17 RX ADMIN — OXYCODONE HYDROCHLORIDE 5 MG: 5 TABLET ORAL at 15:10

## 2025-03-17 RX ADMIN — ONDANSETRON 4 MG: 2 INJECTION, SOLUTION INTRAMUSCULAR; INTRAVENOUS at 14:56

## 2025-03-17 RX ADMIN — ACETAMINOPHEN 975 MG: 325 TABLET, FILM COATED ORAL at 09:41

## 2025-03-17 RX ADMIN — GLYCOPYRROLATE 0.2 MG: 0.2 INJECTION INTRAMUSCULAR; INTRAVENOUS at 13:18

## 2025-03-17 RX ADMIN — FENTANYL CITRATE 25 MCG: 50 INJECTION, SOLUTION INTRAMUSCULAR; INTRAVENOUS at 13:27

## 2025-03-17 RX ADMIN — ONDANSETRON 4 MG: 2 INJECTION, SOLUTION INTRAMUSCULAR; INTRAVENOUS at 13:55

## 2025-03-17 RX ADMIN — Medication 6 L/MIN: at 14:05

## 2025-03-17 RX ADMIN — LIDOCAINE HYDROCHLORIDE 60 MG: 20 INJECTION, SOLUTION EPIDURAL; INFILTRATION; INTRACAUDAL; PERINEURAL at 13:27

## 2025-03-17 RX ADMIN — CEFAZOLIN 2 G: 1 INJECTION, POWDER, FOR SOLUTION INTRAMUSCULAR; INTRAVENOUS at 13:31

## 2025-03-17 RX ADMIN — PROPOFOL 150 MG: 10 INJECTION, EMULSION INTRAVENOUS at 13:27

## 2025-03-17 ASSESSMENT — PAIN SCALES - GENERAL
PAINLEVEL_OUTOF10: 4
PAINLEVEL_OUTOF10: 3
PAINLEVEL_OUTOF10: 2
PAINLEVEL_OUTOF10: 0 - NO PAIN
PAINLEVEL_OUTOF10: 3
PAINLEVEL_OUTOF10: 4

## 2025-03-17 ASSESSMENT — PAIN - FUNCTIONAL ASSESSMENT
PAIN_FUNCTIONAL_ASSESSMENT: 0-10

## 2025-03-17 ASSESSMENT — COLUMBIA-SUICIDE SEVERITY RATING SCALE - C-SSRS
2. HAVE YOU ACTUALLY HAD ANY THOUGHTS OF KILLING YOURSELF?: NO
6. HAVE YOU EVER DONE ANYTHING, STARTED TO DO ANYTHING, OR PREPARED TO DO ANYTHING TO END YOUR LIFE?: NO
1. IN THE PAST MONTH, HAVE YOU WISHED YOU WERE DEAD OR WISHED YOU COULD GO TO SLEEP AND NOT WAKE UP?: NO

## 2025-03-17 NOTE — DISCHARGE INSTRUCTIONS
Plastic Surgery Post Discharge Instructions  You were admitted to [facility] for postoperative monitoring and control of pain following bilateral nipple reconstruction on 3/17 with Dr. Dudley  of plastic surgery. Included below are post-discharge instructions and details regarding follow-up.     Thank you for allowing us to participate in your care and we wish you the best!    Best Regards,  [facility]  Department of Plastic and Reconstructive Surgery    Activity:  No pushing, pulling or lifting objects greater than 5 pounds. Ensure no compression is applied to the chest area. Please do not apply ice or heat directly to the surgical area without barrier in place.     You may locally bathe following discharge. DO NOT shower or submerge in bath until cleared by Dr Dudley. Avoid soaking or submerging surgical incisions/sites o    Surgical Site/Wound Care:    Local wound care instructions: Avoid application of creams, lotions or ointments to surgical site, no soaking or scrubbing of surgical sites. Keep dressings on until follow up      Continue to monitor chest for changes in general appearance, color, temperature and turgor. Please additionally monitor for any developing signs of infection which may include increased redness, swelling, fever/chills, green/yellow drainage, or foul odor from surgical sites or wounds. If any signs of infection or changes in flap appearance are to occur, please immediately contact the plastic surgery office.       Nutrition:  You may resume a regular diet following surgery with increased protein to 120 g a day or more. Ensure that you are drinking an adequate amount of fluids to maintain hydration as well as consuming a diet high in protein and low in sugar. You may consider increasing your fiber intake to avoid constipation.    Do not smoke, as smoking delays healing and increases the risk of complications. Also be sure you are not around  people that smoke for at least 6 weeks after surgery.  Second hand smoke is just as harmful as if you were to smoke.    Medication Instructions:  You may resume use of your home prescribed mediations as previously directed following discharge from the hospital. If you were taking medications prior to your surgery and they are not listed on your discharge homegoing instructions medications list, consult your MD before you resume these medications.    Some postoperative pain is not unusual. This is usually relieved by taking prescribed or over the counter Acetaminophen/Tylenol, Motrin/ibuprofen. In cases of severe pain, you may use prescribed gabapentin, robaxin and ES Tylenol as directed. Severe pain despite administration of pain medication must be reported to your physician.    Remember when taking Acetaminophen, do NOT exceed more than 1000 milligrams (mg) per dose or more than 4000 mg total per day. Taking too much Acetaminophen at one time can damage your liver. The maximum amount of ibuprofen in adults is 800 mg per dose or 3200 mg per day. Call your MD if you have any questions about your medications. To prevent constipation while taking narcotic pain medications, please utilize your prescribed bowel regimen, ensure that you drink plenty of water, eat fiber rich foods (a good source is fruit) and increase activity progressively.    DO NOT drive a car while utilizing narcotic pain medications and until cleared by MD at follow-up appointment. Driving or operating heavy machinery, lawnmowers or power tools while taking opiod/narcotic pain medications may impair your judgement.    Call Physician If:  Contact the plastic surgery office for any questions and/or concerns regarding the surgical incision/site.  1. [phone] if Monday-Friday (8 a.m. - 4:30 p.m.)  2. [phone] and ask for the Plastic Surgery team on call provider if after hours or on weekends    Call your MD or seek immediate medical attention if  you experience any of the following symptoms:  1. Fever of 101.5 (38.5 C) or greater  2. Pain not controlled with prescribed pain medications  3. Uncontrolled nausea and/or vomiting  4. Drainage or swelling around your incisions and/or surgical sites   5. Separation of incisions, or tearing of the incision line  6. Large fluid collection under or around the incision or flap sites   8. Difficulty breathing  9. Swelling, pain, heat and/or redness in your legs and/or calves  10. Inability to tolerate diet/fluid intake    Additional Notes:  Keep Mepilex dressings intact until follow up   No showering or bathing until cleared by Dr Dudley  Make sure you are walking to avoid blood clots  DO NOT lift, push or pull items greater then 5 lbs during your recovery      Follow-up/Post Discharge Appointments:  Follow-up care is a key part of your treatment and safety. It is very important that you maintain follow-up care as directed so that your surgical site heals properly and does not lead to problems. Always carry a current medication list with you and bring it to ALL healthcare Provider visits. Be sure to maintain follow up with plastic surgery at your scheduled appointment. If you are unable to keep your appointment, or need to reschedule please contact our office at [phone].     Follow up on 3/25 at 9:30a

## 2025-03-17 NOTE — ANESTHESIA POSTPROCEDURE EVALUATION
"Patient: Kassi Edmonds \"[place]\"    Procedure Summary       Date: 03/17/25 Room / Location: [facility] A OR 04 / Virtual [facility] OR    Anesthesia Start: 1320 Anesthesia Stop: 1410    Procedure: Bilateral Nipple Reconstruction (Bilateral: Breast) Diagnosis:       Gender dysphoria in adult      (Gender dysphoria in adult [F64.0])    Surgeons: Antoinette Dudley MD Responsible Provider: Pop Montana MD    Anesthesia Type: general ASA Status: 2            Anesthesia Type: general    Vitals Value Taken Time   /56 03/17/25 1525   Temp 36.2 °C (97.2 °F) 03/17/25 1405   Pulse 51 03/17/25 1525   Resp 12 03/17/25 1525   SpO2 100 % 03/17/25 1525       Anesthesia Post Evaluation    Patient location during evaluation: PACU  Patient participation: complete - patient participated  Level of consciousness: awake and alert  Pain management: adequate  Airway patency: patent  Cardiovascular status: acceptable and hemodynamically stable  Respiratory status: acceptable, spontaneous ventilation and nonlabored ventilation  Hydration status: acceptable  Postoperative Nausea and Vomiting: none        There were no known notable events for this encounter.    "

## 2025-03-17 NOTE — ANESTHESIA PROCEDURE NOTES
Airway  Date/Time: 3/17/2025 1:29 PM  Urgency: elective      Staffing  Performed: EDILBERTO   Authorized by: Pop Montana MD    Performed by: MIHIR Flowers  Patient location during procedure: OR    Indications and Patient Condition  Indications for airway management: anesthesia  Spontaneous Ventilation: absent  Sedation level: deep  Preoxygenated: yes  Mask difficulty assessment: 1 - vent by mask    Final Airway Details  Final airway type: supraglottic airway      Successful airway: flexible  Size 4     Number of attempts at approach: 1

## 2025-03-17 NOTE — OP NOTE
"Bilateral Nipple Reconstruction (B) Operative Note     Date: 3/17/2025  OR Location: [facility] A OR    Name: Kassi Edmonds \"[place]\", : 1990, Age: 35 y.o., MRN: [id number], Sex: female    Diagnosis  Pre-op Diagnosis      * Gender dysphoria in adult [F64.0] Post-op Diagnosis     * Gender dysphoria in adult [F64.0]     Procedures  Bilateral Nipple Reconstruction  32100 - PA NIPPLE/AREOLA RECONSTRUCTION      Surgeons      * Antoinette Dudley - Primary    Resident/Fellow/Other Assistant:  Surgeons and Role:  * No surgeons found with a matching role *    Staff:   Surgical Assistant:   Tanishaulator: Domenica  Scrub Person: Esthela Nielsenub Person: Maru    Anesthesia Staff: Anesthesiologist: Pop Montana MD  C-AA: MIHIR Flowers    Procedure Summary  Anesthesia: General  ASA: II  Estimated Blood Loss: 2 ml    Indications: Kassi Edmonds \"[place]\" is an 35 y.o. female who is having surgery for Gender dysphoria in adult [F64.0].  They have a history of bilateral subcutaneous mastectomies to create gender congruence.  The nipples project over centimeter.  At the time of their mastectomies, I was not able to modify the nipples as I was concerned for perfusion and healing.  Therefore they now returned for creating nipples congruent with their gender.    The patient was seen in the preoperative area. The risks, benefits, complications, treatment options, non-operative alternatives, expected recovery and outcomes were discussed with the patient. The risks of the procedure were discussed with them prior to surgery.  These include but are not limited to the risks of anesthesia, infection, bleeding, pain, need for further procedures, hematoma, changes to nipple sensation, wound healing complications, and asymmetry.  The possibilities of reaction to medication, pulmonary aspiration, injury to surrounding structures, bleeding, recurrent infection, the need for additional procedures, failure to diagnose a condition, and creating a complication " requiring transfusion or operation were discussed with the patient. The patient concurred with the proposed plan, giving informed consent.  The site of surgery was properly noted/marked if necessary per policy. The patient has been actively warmed in preoperative area. Preoperative antibiotics have been ordered and given within 1 hours of incision. Venous thrombosis prophylaxis have been ordered including bilateral sequential compression devices    Procedure Details: The patient was identified and marked in the upright and standing position.  They were taken to the operative room and placed in the supine position.  A preoperative time was performed identifying the patient, procedeure and laterality.  An atraumatic LMA and general anesthesia was initiated by the anesthesia team.  Their arms were padded and carefully secured to the arm boards, abducted less than 90 degrees. She was prepped and draped in the usual sterile fashion.    The right nipple measured 1.75 cm and the left nipple measured 1.8 cm.  I marked the inferior 4 mm around the base of the nipples bilaterally.  30 cc of 0.5% Marcaine with epinephrine was used to place blocks around each areola and provide hemostasis.   I sharply excised the distal tips of each nipple and sent them to pathology as permanent specimen.  I obtained hemostasis.  The incisions were closed with a 4-0 Monocryl pursestring followed by 5-0 fast gut simple interrupted's.  The incisions were dressed with mupirocin, xeroform, and mepilexs.      Patient tolerated procedure well.  There were no complications.  They were woken up, the LMA was removed, and they were taken to the recovery room in good condition.    Complications:  None; patient tolerated the procedure well.    Disposition: PACU - hemodynamically stable.  Condition: stable     Attending Attestation: I performed the procedure.    Antoinette Dudley  Phone Number: [phone]

## 2025-03-18 ASSESSMENT — PAIN SCALES - GENERAL: PAINLEVEL_OUTOF10: 0 - NO PAIN

## 2025-03-25 ENCOUNTER — APPOINTMENT (OUTPATIENT)
Dept: PLASTIC SURGERY | Facility: CLINIC | Age: 35
End: 2025-03-25
Payer: COMMERCIAL

## 2025-03-25 VITALS
WEIGHT: 139 LBS | BODY MASS INDEX: 24.63 KG/M2 | HEIGHT: 63 IN | SYSTOLIC BLOOD PRESSURE: 110 MMHG | DIASTOLIC BLOOD PRESSURE: 65 MMHG | HEART RATE: 70 BPM

## 2025-03-25 DIAGNOSIS — F90.0 ATTENTION DEFICIT HYPERACTIVITY DISORDER (ADHD), PREDOMINANTLY INATTENTIVE TYPE: ICD-10-CM

## 2025-03-25 DIAGNOSIS — F64.0 GENDER DYSPHORIA IN ADULT: Primary | ICD-10-CM

## 2025-03-25 RX ORDER — DEXTROAMPHETAMINE SACCHARATE, AMPHETAMINE ASPARTATE, DEXTROAMPHETAMINE SULFATE AND AMPHETAMINE SULFATE 1.25; 1.25; 1.25; 1.25 MG/1; MG/1; MG/1; MG/1
5 TABLET ORAL 2 TIMES DAILY
Qty: 45 TABLET | Refills: 0 | Status: SHIPPED | OUTPATIENT
Start: 2025-03-25 | End: 2025-04-24

## 2025-03-25 RX ORDER — MUPIROCIN 20 MG/G
OINTMENT TOPICAL
Qty: 15 G | Refills: 1 | Status: SHIPPED | OUTPATIENT
Start: 2025-03-25

## 2025-03-26 LAB
LAB AP ASR DISCLAIMER: NORMAL
LABORATORY COMMENT REPORT: NORMAL
PATH REPORT.FINAL DX SPEC: NORMAL
PATH REPORT.GROSS SPEC: NORMAL
PATH REPORT.RELEVANT HX SPEC: NORMAL
PATH REPORT.TOTAL CANCER: NORMAL

## 2025-03-26 NOTE — PROGRESS NOTES
PLASTIC SURGERY CLINIC VISIT  POSTOP BREAST RECONSTRUCTION     Date: 4/1/25  Date of Surgery: 3/17/25  Surgical Procedure: bilateral nipple reconstruction         HPI:   Kassi Edmonds 35 y.o. female is here for post-operative appointment for the above procedure(s).      Interval changes as of this date:   3/25/25: Doing well postoperatively. Pain well controlled. Mepilex dressings intact.   4/1/25  MEDICATIONS    Current Outpatient Medications:     amphetamine-dextroamphetamine (Adderall) 5 mg tablet, Take 1 tablet (5 mg) by mouth 2 times a day., Disp: 45 tablet, Rfl: 0    cetirizine (ZyrTEC) 5 mg tablet, Take 1 tablet (5 mg) by mouth once daily., Disp: , Rfl:     gabapentin (Neurontin) 300 mg capsule, Take 1 capsule (300 mg) by mouth every 8 hours for 14 days., Disp: 42 capsule, Rfl: 0    mupirocin (Bactroban) 2 % ointment, Apply to nipples every 1-2 days covered by mepilex dressing, Disp: 15 g, Rfl: 1      OBJECTIVE [x]Expand by Default  There were no vitals taken for this visit.     REVIEW OF SYSTEMS:    Constitutional: negative for fevers, chills, unintentional weight loss  HEENT: negative for changes in vision, headaches, changes in hearing, congestion, sore throat  Cardiovascular: negative for chest pain, palpitations  Respiratory: negative for cough, shortness of breath  Gastrointestinal: negative for nausea, vomiting, diarrhea  Genitourinary: negative for dysuria, hematuria  Musculoskeletal: negative for joint swelling or pain  Skin: negative for rashes or lesions  Psych: negative for depression, anxiety  Endocrine: negative for polyuria, polydipsia, cold/heat intolerance  Hem/Lymph: negative for bleeding disorder     PHYSICAL EXAM  General: alert and oriented, no apparent distress    Focused exam of the breasts:  Right: Nipple viable, incisions C/D/I, healing well.   Left: Nipple viable, incisions C/D/I. Healing well     ASSESSMENT/PLAN  Kassi Edmonds 35 y.o. female who had bilateral nipple  reconstruction  on 3/17/25 who presents for POV.    Doing well post operatively, pain well controlled. Following activity restrictions.    Nipples viable and healing well.     Plan/Recommendations:  1. Continue increased protein intake  2. Continue activity restrictions  3.         Apply mupirocin ointment and mepilex dressing every 1-2 days  4.         OK to shower

## 2025-04-01 ENCOUNTER — APPOINTMENT (OUTPATIENT)
Dept: PLASTIC SURGERY | Facility: CLINIC | Age: 35
End: 2025-04-01
Payer: COMMERCIAL

## 2025-04-01 VITALS
WEIGHT: 139 LBS | BODY MASS INDEX: 24.63 KG/M2 | HEIGHT: 63 IN | SYSTOLIC BLOOD PRESSURE: 115 MMHG | DIASTOLIC BLOOD PRESSURE: 59 MMHG | HEART RATE: 68 BPM

## 2025-04-01 DIAGNOSIS — F64.0 GENDER DYSPHORIA IN ADULT: Primary | ICD-10-CM

## 2025-04-01 PROCEDURE — 3008F BODY MASS INDEX DOCD: CPT | Performed by: PHYSICIAN ASSISTANT

## 2025-04-01 PROCEDURE — 99024 POSTOP FOLLOW-UP VISIT: CPT | Performed by: PHYSICIAN ASSISTANT

## 2025-04-01 NOTE — LETTER
April 1, 2025     Patient: Kassi Edmonds   YOB: 1990   Date of Visit: 4/1/2025       To Whom It May Concern:    It is my medical opinion that Kassi Edmonds may return to full duty on 4/3/2025 with no restrictions.  If you have any questions or concerns, please don't hesitate to call.          Sincerely,        Antoinette Dudley MD    CC: No Recipients

## 2025-04-13 ENCOUNTER — OFFICE VISIT (OUTPATIENT)
Dept: URGENT CARE | Age: 35
End: 2025-04-13
Payer: COMMERCIAL

## 2025-04-13 VITALS
BODY MASS INDEX: 23.92 KG/M2 | SYSTOLIC BLOOD PRESSURE: 119 MMHG | DIASTOLIC BLOOD PRESSURE: 81 MMHG | TEMPERATURE: 98 F | RESPIRATION RATE: 16 BRPM | HEIGHT: 63 IN | HEART RATE: 77 BPM | OXYGEN SATURATION: 100 % | WEIGHT: 135 LBS

## 2025-04-13 DIAGNOSIS — R05.1 ACUTE COUGH: ICD-10-CM

## 2025-04-13 DIAGNOSIS — J01.90 ACUTE SINUSITIS, RECURRENCE NOT SPECIFIED, UNSPECIFIED LOCATION: Primary | ICD-10-CM

## 2025-04-13 DIAGNOSIS — J02.9 SORE THROAT: ICD-10-CM

## 2025-04-13 LAB
POC HUMAN RHINOVIRUS PCR: NEGATIVE
POC INFLUENZA A VIRUS PCR: NEGATIVE
POC INFLUENZA B VIRUS PCR: NEGATIVE
POC RESPIRATORY SYNCYTIAL VIRUS PCR: NEGATIVE
POC STREPTOCOCCUS PYOGENES (GROUP A STREP) PCR: NEGATIVE

## 2025-04-13 PROCEDURE — 87631 RESP VIRUS 3-5 TARGETS: CPT | Performed by: STUDENT IN AN ORGANIZED HEALTH CARE EDUCATION/TRAINING PROGRAM

## 2025-04-13 PROCEDURE — 87651 STREP A DNA AMP PROBE: CPT | Performed by: STUDENT IN AN ORGANIZED HEALTH CARE EDUCATION/TRAINING PROGRAM

## 2025-04-13 PROCEDURE — 99203 OFFICE O/P NEW LOW 30 MIN: CPT | Performed by: STUDENT IN AN ORGANIZED HEALTH CARE EDUCATION/TRAINING PROGRAM

## 2025-04-13 PROCEDURE — 3008F BODY MASS INDEX DOCD: CPT | Performed by: STUDENT IN AN ORGANIZED HEALTH CARE EDUCATION/TRAINING PROGRAM

## 2025-04-13 RX ORDER — BENZONATATE 200 MG/1
200 CAPSULE ORAL 3 TIMES DAILY PRN
Qty: 21 CAPSULE | Refills: 0 | Status: SHIPPED | OUTPATIENT
Start: 2025-04-13 | End: 2025-04-20

## 2025-04-13 RX ORDER — AMOXICILLIN AND CLAVULANATE POTASSIUM 875; 125 MG/1; MG/1
875 TABLET, FILM COATED ORAL 2 TIMES DAILY
Qty: 14 TABLET | Refills: 0 | Status: SHIPPED | OUTPATIENT
Start: 2025-04-14 | End: 2025-04-21

## 2025-04-13 NOTE — PROGRESS NOTES
"Subjective   Patient ID: Kassi Edmonds \"[place]" is a 35 y.o. female. They present today with a chief complaint of Sore Throat (Sore throat, cough, nasal congestion x 1 week ).    History of Present Illness  \"Diane" Is a pleasant 35-year-old who presents to the urgent care for evaluation of sore throat, cough and nasal congestion with postnasal drip for 1 week duration.  Patient main concern is sore throat and would like to rule out strep.  Patient denies choking or difficulty swallowing.  Patient denies chest pain or shortness of breath.  Patient is seeking evaluation reassurance as they have attempted Mucinex with minimal improvement of symptoms.  No other symptoms or concerns otherwise reported.      Past Medical History  Allergies as of 04/13/2025 - Reviewed 04/13/2025   Allergen Reaction Noted    Cat hair standardized allergenic extract Other 08/30/2017       (Not in a hospital admission)       Past Medical History:   Diagnosis Date    ADD (attention deficit disorder)     Delayed emergence from general anesthesia     Gender dysphoria in adult        Past Surgical History:   Procedure Laterality Date    BREAST SURGERY Bilateral 10/10/2024    Reduction, mammaplasty    SALPINGECTOMY Bilateral     TUBAL LIGATION  10/18/2023    VENTRAL HERNIA REPAIR  12/26/2024        reports that she has never smoked. She has never been exposed to tobacco smoke. She has never used smokeless tobacco. She reports current alcohol use. She reports that she does not currently use drugs after having used the following drugs: Marijuana.    Review of Systems  A 10-point review of systems was performed, otherwise unremarkable unless stated in the history of present illness.                Objective    Vitals:    04/13/25 0809   BP: 119/81   Pulse: 77   Resp: 16   Temp: 36.7 °C (98 °F)   TempSrc: Oral   SpO2: 100%   Weight: 61.2 kg (135 lb)   Height: 1.6 m (5' 3\")     No LMP recorded.    Gen: Vitals noted and reviewed, no evidence of acute " distress, well developed and afebrile.   Psych: Mood and affect appropriate for setting.  Head/Face: Atraumatic and normocephalic.   Neuro: No focal deficits noted.  ENT: TMs clear bilaterally, EACs unremarkable. Minimal TTP over b/l frontal and maxillary sinuses, mastoids non-tender. Posterior oropharynx with erythema, without exudate, or swelling. Uvula is in the midline and non-edematous.   Neck: Supple. No meningismus through full range of motion. No lymphadenopathy.   Cardiac: Regular rate and rhythm no murmur.   Lungs: Clear to auscultation throughout, No evidence of wheezing, rhonchi or crackles. Good aeration throughout.   Extremities: Symmetrical, No peripheral edema  Skin: Without evidence of ecchymosis, wounds, or rashes.      Point of Care Test & Imaging Results from this visit  Results for orders placed or performed in visit on 04/13/25   POCT SPOTFIRE R/ST Panel Mini w/Strep A ([employer]) manually resulted   Result Value Ref Range    POC Group A Strep, PCR Negative Negative    POC Respiratory Syncytial Virus PCR Negative Negative    POC Influenza A Virus PCR Negative Negative    POC Influenza B Virus PCR Negative Negative    POC Human Rhinovirus PCR Negative Negative      Imaging  No results found.    Cardiology, Vascular, and Other Imaging  No other imaging results found for the past 2 days      Diagnostic study results (if any) were reviewed by Jonathan Sebastian DO.    Assessment/Plan   Allergies, medications, history, and pertinent labs/EKGs/Imaging reviewed by Jonathan Sebastian DO.     Medical Decision Making  Discussed with the patient symptoms and clinical presentation findings suggestive of acute pharyngitis likely secondary viral infection of unclear etiology with questionable acute sinusitis.  Would advise close symptom monitoring supportive treatment use of over-the-counter remedies to aid in symptom management.  Given the patient's duration of symptoms and lack improvement with supportive treatment  measures we agreed to initiate antimicrobial coverage and prescribed Augmentin however advised holding off 1 to 2 days prior to starting this and attempt taking additional measures with over-the-counter remedies and prescribed Tessalon Perles prior to this. Follow up with PCP. We advised seeking immediate emergency medical attention if symptoms fail to improve, worsen or any concerning symptoms arise. Patient voiced full understanding and agreement to plan.      Orders and Diagnoses  Diagnoses and all orders for this visit:  Acute sinusitis, recurrence not specified, unspecified location  -     amoxicillin-pot clavulanate (Augmentin) 875-125 mg tablet; Take 1 tablet (875 mg) by mouth 2 times a day for 7 days. Do not fill before April 14, 2025.  Sore throat  -     POCT SPOTFIRE R/ST Panel Mini w/Strep [facility] manually resulted  Acute cough  -     benzonatate (Tessalon) 200 mg capsule; Take 1 capsule (200 mg) by mouth 3 times a day as needed for cough for up to 7 days. Do not crush or chew.      Medical Admin Record      Patient disposition: Home    Electronically signed by Jonathan Sebastian DO  10:39 AM

## 2025-04-15 ENCOUNTER — APPOINTMENT (OUTPATIENT)
Dept: PLASTIC SURGERY | Facility: CLINIC | Age: 35
End: 2025-04-15
Payer: COMMERCIAL

## 2025-04-25 DIAGNOSIS — J01.90 ACUTE SINUSITIS, RECURRENCE NOT SPECIFIED, UNSPECIFIED LOCATION: ICD-10-CM

## 2025-04-25 RX ORDER — AMOXICILLIN AND CLAVULANATE POTASSIUM 875; 125 MG/1; MG/1
875 TABLET, FILM COATED ORAL 2 TIMES DAILY
Qty: 20 TABLET | Refills: 0 | Status: SHIPPED | OUTPATIENT
Start: 2025-04-25 | End: 2025-05-05

## 2025-06-20 ENCOUNTER — DOCUMENTATION (OUTPATIENT)
Dept: PHYSICAL THERAPY | Facility: HOSPITAL | Age: 35
End: 2025-06-20
Payer: COMMERCIAL

## 2025-06-20 DIAGNOSIS — G89.18 ACUTE POST-OPERATIVE PAIN: ICD-10-CM

## 2025-06-20 DIAGNOSIS — M79.602 PAIN IN BOTH UPPER EXTREMITIES: ICD-10-CM

## 2025-06-20 DIAGNOSIS — M79.601 PAIN IN BOTH UPPER EXTREMITIES: ICD-10-CM

## 2025-06-20 DIAGNOSIS — F90.0 ATTENTION DEFICIT HYPERACTIVITY DISORDER (ADHD), PREDOMINANTLY INATTENTIVE TYPE: ICD-10-CM

## 2025-06-20 RX ORDER — DEXTROAMPHETAMINE SACCHARATE, AMPHETAMINE ASPARTATE, DEXTROAMPHETAMINE SULFATE AND AMPHETAMINE SULFATE 1.25; 1.25; 1.25; 1.25 MG/1; MG/1; MG/1; MG/1
5 TABLET ORAL 2 TIMES DAILY
Qty: 45 TABLET | Refills: 0 | Status: SHIPPED | OUTPATIENT
Start: 2025-06-20 | End: 2025-07-20

## 2025-06-20 NOTE — PROGRESS NOTES
"Physical Therapy    Discharge Summary    Name: Kassi GARCIA Grey \"[place]\"  MRN: [id number]  : 1990  Date: 25    Discharge Summary: PT    Discharge Information: Date of discharge 2025    Rehab Discharge Reason: Achieved all and/or the most significant goals(s)  "

## 2025-07-09 ENCOUNTER — APPOINTMENT (OUTPATIENT)
Dept: PRIMARY CARE | Facility: CLINIC | Age: 35
End: 2025-07-09
Payer: COMMERCIAL

## 2025-07-09 VITALS
OXYGEN SATURATION: 100 % | DIASTOLIC BLOOD PRESSURE: 68 MMHG | HEIGHT: 63 IN | HEART RATE: 79 BPM | SYSTOLIC BLOOD PRESSURE: 110 MMHG | BODY MASS INDEX: 24.36 KG/M2 | WEIGHT: 137.5 LBS | TEMPERATURE: 97.8 F

## 2025-07-09 DIAGNOSIS — F90.0 ATTENTION DEFICIT HYPERACTIVITY DISORDER (ADHD), PREDOMINANTLY INATTENTIVE TYPE: ICD-10-CM

## 2025-07-09 DIAGNOSIS — B07.9 VIRAL WARTS, UNSPECIFIED TYPE: ICD-10-CM

## 2025-07-09 DIAGNOSIS — Z00.00 ROUTINE GENERAL MEDICAL EXAMINATION AT A HEALTH CARE FACILITY: Primary | ICD-10-CM

## 2025-07-09 DIAGNOSIS — Z23 ENCOUNTER FOR IMMUNIZATION: ICD-10-CM

## 2025-07-09 PROCEDURE — 90651 9VHPV VACCINE 2/3 DOSE IM: CPT | Performed by: NURSE PRACTITIONER

## 2025-07-09 PROCEDURE — 3008F BODY MASS INDEX DOCD: CPT | Performed by: NURSE PRACTITIONER

## 2025-07-09 PROCEDURE — 90471 IMMUNIZATION ADMIN: CPT | Performed by: NURSE PRACTITIONER

## 2025-07-09 PROCEDURE — 99395 PREV VISIT EST AGE 18-39: CPT | Performed by: NURSE PRACTITIONER

## 2025-07-09 ASSESSMENT — PAIN SCALES - GENERAL: PAINLEVEL_OUTOF10: 0-NO PAIN

## 2025-07-09 NOTE — PROGRESS NOTES
"Subjective   Kassi Edmonds \"[place]\" is a 35 y.o. female and is here for a comprehensive physical exam. The patient reports no problems.    Doing better with IR adderall  Sleep is better    No palpitations since last visit     UTD with screening dental and eyes     History:  LMP: Patient's last menstrual period was 07/04/2025 (approximate).  Menopause at NA years  Last pap date: 8/2023  Abnormal pap? no    OARRS:  Ryanne Jordan, APRN-CNP on 7/9/2025  8:16 AM  I have personally reviewed the OARRS report for Kassi Edmonds. I have considered the risks of abuse, dependence, addiction and diversion and I believe that it is clinically appropriate for Kassi Edmonds to be prescribed this medication    Is the patient prescribed a combination of a benzodiazepine and opioid?  No    Last Urine Drug Screen / ordered today: Yes  Recent Results (from the past 8760 hours)   Amphetamine Confirm, Urine    Collection Time: 01/09/25  2:30 PM   Result Value Ref Range    Methamphetamine Quant, Ur <200 ng/mL    MDA, Urine <200 ng/mL    MDEA, Urine <200 ng/mL    Phentermine,Urine <200 ng/mL    Amphetamines,Urine <50 ng/mL    MDMA, Urine <200 ng/mL   Drug Screen, Urine With Reflex to Confirmation    Collection Time: 01/09/25  2:30 PM   Result Value Ref Range    Amphetamine Screen, Urine Presumptive Negative Presumptive Negative    Barbiturate Screen, Urine Presumptive Negative Presumptive Negative    Benzodiazepines Screen, Urine Presumptive Negative Presumptive Negative    Cannabinoid Screen, Urine Presumptive Negative Presumptive Negative    Cocaine Metabolite Screen, Urine Presumptive Negative Presumptive Negative    Fentanyl Screen, Urine Presumptive Negative Presumptive Negative    Opiate Screen, Urine Presumptive Negative Presumptive Negative    Oxycodone Screen, Urine Presumptive Negative Presumptive Negative    PCP Screen, Urine Presumptive Negative Presumptive Negative    Methadone Screen, Urine Presumptive Negative Presumptive " Negative     Results are as expected.       Controlled Substance Agreement:  Date of the Last Agreement: 1/2025  Reviewed Controlled Substance Agreement including but not limited to the benefits, risks, and alternatives to treatment with a Controlled Substance medication(s).    Stimulants:   What is the patient's goal of therapy? ADHD  Is this being achieved with current treatment? yes    Activities of Daily Living:   Is your overall impression that this patient is benefiting (symptom reduction outweighs side effects) from stimulant therapy? Yes     1. Physical Functioning: Better  2. Family Relationship: Better  3. Social Relationship: Better  4. Mood: Better  5. Sleep Patterns: Better  6. Overall Function: Better      Do you have pain that bothers you in your daily life? no    Review of Systems     Objective   Physical Exam  Vitals and nursing note reviewed.   Constitutional:       General: She is not in acute distress.     Appearance: Normal appearance. She is normal weight. She is not ill-appearing or toxic-appearing.   HENT:      Head: Normocephalic.      Right Ear: Tympanic membrane, ear canal and external ear normal.      Left Ear: Tympanic membrane, ear canal and external ear normal.      Nose: Nose normal.      Mouth/Throat:      Mouth: Mucous membranes are moist.      Pharynx: Oropharynx is clear.     Eyes:      Conjunctiva/sclera: Conjunctivae normal.     Neck:      Thyroid: No thyromegaly.     Cardiovascular:      Rate and Rhythm: Normal rate and regular rhythm.      Pulses: Normal pulses.      Heart sounds: Normal heart sounds. No murmur heard.  Pulmonary:      Effort: Pulmonary effort is normal. No respiratory distress.      Breath sounds: Normal breath sounds.   Abdominal:      General: Bowel sounds are normal.      Palpations: Abdomen is soft.      Tenderness: There is no abdominal tenderness.     Musculoskeletal:         General: Normal range of motion.      Cervical back: Neck supple.    Lymphadenopathy:      Cervical: Cervical adenopathy (shotty) present.     Skin:     General: Skin is warm and dry.      Capillary Refill: Capillary refill takes less than 2 seconds.      Findings: No rash.     Neurological:      General: No focal deficit present.      Mental Status: Mental status is at baseline.      Gait: Gait normal.     Psychiatric:         Mood and Affect: Mood normal.         Judgment: Judgment normal.       Assessment/Plan   Healthy female exam.     1. Continue current Adderall dose as prescribed.  2. Patient Counseling:  --Nutrition: Stressed importance of moderation in sodium/caffeine intake, saturated fat and cholesterol, caloric balance, sufficient intake of fresh fruits, vegetables, fiber, calcium, iron, and 1 mg of folate supplement per day (for females capable of pregnancy).  --Discussed the issue of estrogen replacement, calcium supplement, and the daily use of baby aspirin.  --Exercise: Stressed the importance of regular exercise.   --Substance Abuse: Discussed cessation/primary prevention of tobacco, alcohol, or other drug use; driving or other dangerous activities under the influence; availability of treatment for abuse.    --Sexuality: Discussed sexually transmitted diseases, partner selection, use of condoms, avoidance of unintended pregnancy  and contraceptive alternatives.   --Injury prevention: Discussed safety belts, safety helmets, smoke detector, smoking near bedding or upholstery.   --Dental health: Discussed importance of regular tooth brushing, flossing, and dental visits.  --Immunizations reviewed.  --Discussed benefits of screening colonoscopy.  --After hours service discussed with patient  3. Discussed the patient's BMI with her.  The BMI is in the acceptable range.  4. Follow up next physical in 1 year     Patient Instructions   It is important that you have yearly check-ups with your healthcare provider to ensure that you stay up to date on your health, screenings,  and vaccinations, even if you feel healthy.     Make sure you eat a diet rich in fruits, vegetables, nuts, beans, whole grains, lean meat, eggs, calcium, and good fats (i.e. olive oil, avocado baked fish). AVOID a diet that is high in red meat, trans- and saturated fats, sweetened beverages, and refined grains (i.e. white bread, rice, sweetened cereals).     Stay hydrated with at least 64 ounces of water daily.    Exercise most days per week, for at least 30-45 minutes per session.     Be sure to wear sunscreen of SPF of 15 or higher if you are going to be outside.    Stress management and coping skills are important to manage our stress levels. Some tips include keep in mind that stress isn't a bad thing, talk about your problems, prioritize your responsibilities, focus on the basics, don't put all your eggs in one basket, set aside time for yourself, and keep things in perspective.     Stay up to date with annual eye exams and twice yearly dental exams.    Recommend annual flu vaccination, in addition to age appropriate recommended vaccines.  Repeat HPV series to get updated Gardasil 9  HPV #1 today. Follow up in 1-2 month and 6 months to complete series.    PAP smear is recommended every 2-3 years for women 21-29 and every 3-5 years for women 30-65.    Colon cancer screening based on age, family history, and other risk factors.  Due at age 45    Fasting blood work ordered    ADHD stable.  Continue adderall as prescribed.  Follow up in 6 months for evaluation of therapy

## 2025-07-09 NOTE — PATIENT INSTRUCTIONS
It is important that you have yearly check-ups with your healthcare provider to ensure that you stay up to date on your health, screenings, and vaccinations, even if you feel healthy.     Make sure you eat a diet rich in fruits, vegetables, nuts, beans, whole grains, lean meat, eggs, calcium, and good fats (i.e. olive oil, avocado baked fish). AVOID a diet that is high in red meat, trans- and saturated fats, sweetened beverages, and refined grains (i.e. white bread, rice, sweetened cereals).     Stay hydrated with at least 64 ounces of water daily.    Exercise most days per week, for at least 30-45 minutes per session.     Be sure to wear sunscreen of SPF of 15 or higher if you are going to be outside.    Stress management and coping skills are important to manage our stress levels. Some tips include keep in mind that stress isn't a bad thing, talk about your problems, prioritize your responsibilities, focus on the basics, don't put all your eggs in one basket, set aside time for yourself, and keep things in perspective.     Stay up to date with annual eye exams and twice yearly dental exams.    Recommend annual flu vaccination, in addition to age appropriate recommended vaccines.  Repeat HPV series to get updated Gardasil 9  HPV #1 today. Follow up in 1-2 month and 6 months to complete series.    PAP smear is recommended every 2-3 years for women 21-29 and every 3-5 years for women 30-65.    Colon cancer screening based on age, family history, and other risk factors.  Due at age 45    Fasting blood work ordered    ADHD stable.  Continue adderall as prescribed.  Follow up in 6 months for evaluation of therapy

## 2025-07-11 LAB
ALBUMIN SERPL-MCNC: 4.6 G/DL (ref 3.6–5.1)
ALP SERPL-CCNC: 33 U/L (ref 31–125)
ALT SERPL-CCNC: 15 U/L (ref 6–29)
ANION GAP SERPL CALCULATED.4IONS-SCNC: 8 MMOL/L (CALC) (ref 7–17)
AST SERPL-CCNC: 16 U/L (ref 10–30)
BASOPHILS # BLD AUTO: 18 CELLS/UL (ref 0–200)
BASOPHILS NFR BLD AUTO: 0.4 %
BILIRUB SERPL-MCNC: 0.9 MG/DL (ref 0.2–1.2)
BUN SERPL-MCNC: 12 MG/DL (ref 7–25)
CALCIUM SERPL-MCNC: 9.5 MG/DL (ref 8.6–10.2)
CHLORIDE SERPL-SCNC: 106 MMOL/L (ref 98–110)
CHOLEST SERPL-MCNC: 123 MG/DL
CHOLEST/HDLC SERPL: 1.7 (CALC)
CO2 SERPL-SCNC: 25 MMOL/L (ref 20–32)
CREAT SERPL-MCNC: 0.9 MG/DL (ref 0.5–0.97)
EGFRCR SERPLBLD CKD-EPI 2021: 85 ML/MIN/1.73M2
EOSINOPHIL # BLD AUTO: 120 CELLS/UL (ref 15–500)
EOSINOPHIL NFR BLD AUTO: 2.6 %
ERYTHROCYTE [DISTWIDTH] IN BLOOD BY AUTOMATED COUNT: 12.6 % (ref 11–15)
GLUCOSE SERPL-MCNC: 88 MG/DL (ref 65–99)
HCT VFR BLD AUTO: 41.7 % (ref 35–45)
HDLC SERPL-MCNC: 72 MG/DL
HGB BLD-MCNC: 13.4 G/DL (ref 11.7–15.5)
LDLC SERPL CALC-MCNC: 45 MG/DL (CALC)
LYMPHOCYTES # BLD AUTO: 1996 CELLS/UL (ref 850–3900)
LYMPHOCYTES NFR BLD AUTO: 43.4 %
MCH RBC QN AUTO: 30.5 PG (ref 27–33)
MCHC RBC AUTO-ENTMCNC: 32.1 G/DL (ref 32–36)
MCV RBC AUTO: 95 FL (ref 80–100)
MONOCYTES # BLD AUTO: 313 CELLS/UL (ref 200–950)
MONOCYTES NFR BLD AUTO: 6.8 %
NEUTROPHILS # BLD AUTO: 2153 CELLS/UL (ref 1500–7800)
NEUTROPHILS NFR BLD AUTO: 46.8 %
NONHDLC SERPL-MCNC: 51 MG/DL (CALC)
PLATELET # BLD AUTO: 272 THOUSAND/UL (ref 140–400)
PMV BLD REES-ECKER: 9.5 FL (ref 7.5–12.5)
POTASSIUM SERPL-SCNC: 4.5 MMOL/L (ref 3.5–5.3)
PROT SERPL-MCNC: 7.2 G/DL (ref 6.1–8.1)
RBC # BLD AUTO: 4.39 MILLION/UL (ref 3.8–5.1)
SODIUM SERPL-SCNC: 139 MMOL/L (ref 135–146)
TRIGL SERPL-MCNC: 21 MG/DL
WBC # BLD AUTO: 4.6 THOUSAND/UL (ref 3.8–10.8)

## 2025-08-20 ENCOUNTER — APPOINTMENT (OUTPATIENT)
Dept: PRIMARY CARE | Facility: CLINIC | Age: 35
End: 2025-08-20
Payer: COMMERCIAL

## 2025-08-20 DIAGNOSIS — Z23 ENCOUNTER FOR IMMUNIZATION: Primary | ICD-10-CM

## 2025-08-20 PROCEDURE — 90471 IMMUNIZATION ADMIN: CPT | Performed by: NURSE PRACTITIONER

## 2025-08-20 PROCEDURE — 90651 9VHPV VACCINE 2/3 DOSE IM: CPT | Performed by: NURSE PRACTITIONER

## 2026-01-06 ENCOUNTER — APPOINTMENT (OUTPATIENT)
Dept: PRIMARY CARE | Facility: CLINIC | Age: 36
End: 2026-01-06
Payer: COMMERCIAL

## (undated) DEVICE — SUTURE, PROLENE, 0, 30 IN, CT1, BLUE

## (undated) DEVICE — SUTURE, MONOCRYL, 4-0, 27 IN, PS-2, UNDYED

## (undated) DEVICE — SPONGE, LAP, XRAY DECT, 18IN X 18IN, W/MASTER DMT, STERILE

## (undated) DEVICE — NEEDLE, HYPO, 22G X 1 1/2IN, ECLIP, LF

## (undated) DEVICE — MARKER, SKIN, DUAL TIP, W/RULER

## (undated) DEVICE — Device

## (undated) DEVICE — EVACUATOR, WOUND, SUCTION, CLOSED, JACKSON-PRATT, 100 CC, SILICONE

## (undated) DEVICE — MARKER, SKIN, DUAL TIP INK W/9 LABEL AND REMOVABLE TIME OUT SLEEVE

## (undated) DEVICE — SUTURE, ETHILON, 2-0, 18 IN, FS, BLACK, BX/36

## (undated) DEVICE — RETRACTOR, CORDLESS, RADIALUX, LIGHTED

## (undated) DEVICE — ELECTRODE, ELECTROSURGICAL, BLADE EXT 4 INCH, INSULATED

## (undated) DEVICE — SOLUTION, INJECTION, USP, NACL, SODIUM CHLORIDE 0.45%, 1000 ML, BAG

## (undated) DEVICE — TOWEL, SURGICAL, NEURO, O/R, 16 X 26, BLUE, STERILE

## (undated) DEVICE — SUTURE, MONOCRYL, 3-0, 27 IN, PS-2, UNDYED

## (undated) DEVICE — GLOVE, SURGICAL, PROTEXIS PI , 7.0, PF, LF

## (undated) DEVICE — DRAPE, INSTRUMENT, W/POUCH, STERI DRAPE, 7 X 11 IN, DISPOSABLE, STERILE

## (undated) DEVICE — DRAPE, SHEET, LAPAROTOMY, W/ISO-BAC, W/ARMBOARD COVERS, 98 X 122 IN, DISPOSABLE, LF, STERILE

## (undated) DEVICE — SUTURE, MONOCRYL, 4-0, 18 IN, PS2, UNDYED

## (undated) DEVICE — GLOVE, SURGICAL, BIOGEL PI ULTRA TOUCH SYN PF LF SZ 6.5

## (undated) DEVICE — PAD, GROUNDING, ELECTROSURGICAL, W/9 FT CABLE, POLYHESIVE II, ADULT, LF

## (undated) DEVICE — SUTURE, PROLENE, 2-0, CT-1, BL MONO, LF

## (undated) DEVICE — ELECTRODE, ELECTROSURGICAL, BLADE, INSULATED, ENT/IMA, STERILE

## (undated) DEVICE — THERAPY UNIT, 14-DAY PREVENA PLUS 125

## (undated) DEVICE — BANDAGE, GAUZE, CONFORMING, KERLIX, 6 PLY, 4.5 IN X 4.1 YD

## (undated) DEVICE — RAYON BALL, LARGE, STERILE

## (undated) DEVICE — SUTURE, PROLENE, 5-0, 18 IN, PS3, BLUE

## (undated) DEVICE — GLOVE, SURGICAL, BIOGEL PI MICRO INDICATOR UNDERGLOVE PF LF SZ 7 BLUE

## (undated) DEVICE — TUBING, SUCTION, NON-CONDUCTIVE, W/CONNECT,.25 IN X 12 FT, STERILE, LF

## (undated) DEVICE — WOUND SYSTEM, DEBRIDEMENT & CLEANING, O.R DUOPAK

## (undated) DEVICE — DRESSING, TEGADERM, CHG, 3.5 X 4.5 IN

## (undated) DEVICE — DRAPE, SHEET, THREE QUARTER, FAN FOLD, 57 X 77 IN

## (undated) DEVICE — KIT, PREVENA RESTOR BELLA FORM, MED, 24X22

## (undated) DEVICE — DRAPE, INCISE, ANTIMICROBIAL, IOBAN 2, LARGE, 17 X 23 IN, DISPOSABLE, STERILE

## (undated) DEVICE — SUTURE, VICRYL, 3-0, 27 IN, SH

## (undated) DEVICE — DRESSING, TRANSPARENT, TEGADERM, 4 X 4-3/4 IN

## (undated) DEVICE — SUTURE, VICRYL, 2-0, 36 IN, CT-1, UNDYED

## (undated) DEVICE — APPLICATOR, CHLORAPREP, W/ORANGE TINT, 26ML

## (undated) DEVICE — NEEDLE, HYPODERMIC, 23 GA X 1.5 IN